# Patient Record
Sex: FEMALE | Race: WHITE | URBAN - NONMETROPOLITAN AREA
[De-identification: names, ages, dates, MRNs, and addresses within clinical notes are randomized per-mention and may not be internally consistent; named-entity substitution may affect disease eponyms.]

---

## 2023-02-15 ENCOUNTER — DOCTOR'S OFFICE (OUTPATIENT)
Dept: URBAN - NONMETROPOLITAN AREA CLINIC 1 | Facility: CLINIC | Age: 58
Setting detail: OPHTHALMOLOGY
End: 2023-02-15
Payer: COMMERCIAL

## 2023-02-15 DIAGNOSIS — H04.123: ICD-10-CM

## 2023-02-15 DIAGNOSIS — H25.13: ICD-10-CM

## 2023-02-15 DIAGNOSIS — H01.004: ICD-10-CM

## 2023-02-15 DIAGNOSIS — H18.832: ICD-10-CM

## 2023-02-15 DIAGNOSIS — H43.813: ICD-10-CM

## 2023-02-15 DIAGNOSIS — H01.001: ICD-10-CM

## 2023-02-15 DIAGNOSIS — H02.88B: ICD-10-CM

## 2023-02-15 PROCEDURE — 92014 COMPRE OPH EXAM EST PT 1/>: CPT | Performed by: OPHTHALMOLOGY

## 2023-02-15 PROCEDURE — 92134 CPTRZ OPH DX IMG PST SGM RTA: CPT | Performed by: OPHTHALMOLOGY

## 2023-02-15 ASSESSMENT — LID EXAM ASSESSMENTS
OD_BLEPHARITIS: T 1+
OS_BLEPHARITIS: T 1+

## 2023-02-15 ASSESSMENT — REFRACTION_MANIFEST
OD_CYLINDER: -0.50
OS_CYLINDER: -0.25
OD_AXIS: 090
OD_ADD: +2.50
OS_VA2: 20/20-2
OS_ADD: +2.50
OS_SPHERE: -2.50
OD_VA2: 20/25-2
OS_VA1: 20/25-2
OD_VA1: 20/30+2
OS_AXIS: 125
OD_SPHERE: -3.00

## 2023-02-15 ASSESSMENT — REFRACTION_CURRENTRX
OS_CYLINDER: -0.25
OS_ADD: +2.50
OD_VPRISM_DIRECTION: PROGS
OS_VPRISM_DIRECTION: PROGS
OS_SPHERE: -2.50
OD_OVR_VA: 20/
OD_AXIS: 099
OD_CYLINDER: -0.75
OD_ADD: +2.50
OS_AXIS: 114
OS_OVR_VA: 20/
OD_SPHERE: -3.00

## 2023-02-15 ASSESSMENT — VISUAL ACUITY
OD_BCVA: 20/30
OS_BCVA: 20/40

## 2023-02-15 ASSESSMENT — AXIALLENGTH_DERIVED
OD_AL: 23.8565
OD_AL: 23.7573
OS_AL: 23.7432
OS_AL: 23.8423

## 2023-02-15 ASSESSMENT — CONFRONTATIONAL VISUAL FIELD TEST (CVF)
OS_FINDINGS: FULL
OD_FINDINGS: FULL

## 2023-02-15 ASSESSMENT — KERATOMETRY
OS_AXISANGLE_DEGREES: 001
OD_K2POWER_DIOPTERS: 46.50
OD_K1POWER_DIOPTERS: 46.50
OS_K1POWER_DIOPTERS: 46.00
OD_AXISANGLE_DEGREES: 157
OS_K2POWER_DIOPTERS: 45.75

## 2023-02-15 ASSESSMENT — SPHEQUIV_DERIVED
OS_SPHEQUIV: -2.875
OS_SPHEQUIV: -2.625
OD_SPHEQUIV: -3.25
OD_SPHEQUIV: -3.5

## 2023-02-15 ASSESSMENT — REFRACTION_AUTOREFRACTION
OD_CYLINDER: -1.50
OS_SPHERE: -2.50
OD_SPHERE: -2.75
OS_CYLINDER: -0.75
OS_AXIS: 081
OD_AXIS: 101

## 2023-02-15 ASSESSMENT — PUNCTA - ASSESSMENT
OS_PUNCTA: SIL PLUG LLL LARGE
OD_PUNCTA: SIL PLUG RLL LARGE

## 2023-02-28 ENCOUNTER — OPTICAL OFFICE (OUTPATIENT)
Dept: URBAN - NONMETROPOLITAN AREA CLINIC 4 | Facility: CLINIC | Age: 58
Setting detail: OPHTHALMOLOGY
End: 2023-02-28
Payer: COMMERCIAL

## 2023-02-28 ENCOUNTER — DOCTOR'S OFFICE (OUTPATIENT)
Dept: URBAN - NONMETROPOLITAN AREA CLINIC 1 | Facility: CLINIC | Age: 58
Setting detail: OPHTHALMOLOGY
End: 2023-02-28
Payer: COMMERCIAL

## 2023-02-28 DIAGNOSIS — H52.4: ICD-10-CM

## 2023-02-28 DIAGNOSIS — H52.13: ICD-10-CM

## 2023-02-28 PROBLEM — H01.001 BLEPHARITIS; RIGHT UPPER LID, LEFT UPPER LID: Status: ACTIVE | Noted: 2023-02-15

## 2023-02-28 PROBLEM — H01.004 BLEPHARITIS; RIGHT UPPER LID, LEFT UPPER LID: Status: ACTIVE | Noted: 2023-02-15

## 2023-02-28 PROCEDURE — V2784 LENS POLYCARB OR EQUAL: HCPCS | Performed by: OPTOMETRIST

## 2023-02-28 PROCEDURE — V2020 VISION SVCS FRAMES PURCHASES: HCPCS | Performed by: OPTOMETRIST

## 2023-02-28 PROCEDURE — 92012 INTRM OPH EXAM EST PATIENT: CPT | Performed by: OPTOMETRIST

## 2023-02-28 PROCEDURE — 92015 DETERMINE REFRACTIVE STATE: CPT | Performed by: OPTOMETRIST

## 2023-02-28 PROCEDURE — V2203 LENS SPHCYL BIFOCAL 4.00D/.1: HCPCS | Performed by: OPTOMETRIST

## 2023-02-28 ASSESSMENT — REFRACTION_AUTOREFRACTION
OS_AXIS: 35
OD_AXIS: 107
OS_CYLINDER: -0.50
OS_SPHERE: -2.50
OD_CYLINDER: -2.00
OD_SPHERE: -2.50

## 2023-02-28 ASSESSMENT — REFRACTION_MANIFEST
OS_VA1: 20/25-2
OS_ADD: +2.50
OD_ADD: +2.50
OS_AXIS: 125
OD_VA2: 20/25-2
OD_VA1: 20/30+2
OD_AXIS: 095
OS_CYLINDER: -0.50
OD_SPHERE: -3.00
OS_VA2: 20/25-2
OS_SPHERE: -2.50
OD_CYLINDER: -0.75

## 2023-02-28 ASSESSMENT — REFRACTION_CURRENTRX
OD_VPRISM_DIRECTION: BF
OS_CYLINDER: -0.25
OS_AXIS: 117
OD_OVR_VA: 20/
OD_CYLINDER: -0.75
OD_SPHERE: -3.00
OS_OVR_VA: 20/
OS_VPRISM_DIRECTION: BF
OS_ADD: +2.50
OS_SPHERE: -2.50
OD_AXIS: 95
OD_ADD: +2.50

## 2023-02-28 ASSESSMENT — CONFRONTATIONAL VISUAL FIELD TEST (CVF)
OS_FINDINGS: FULL
OD_FINDINGS: FULL

## 2023-02-28 ASSESSMENT — LID EXAM ASSESSMENTS
OD_BLEPHARITIS: T 1+
OS_BLEPHARITIS: T 1+

## 2023-02-28 ASSESSMENT — SPHEQUIV_DERIVED
OS_SPHEQUIV: -2.75
OS_SPHEQUIV: -2.75
OD_SPHEQUIV: -3.5
OD_SPHEQUIV: -3.375

## 2023-02-28 ASSESSMENT — PUNCTA - ASSESSMENT
OS_PUNCTA: SIL PLUG LLL LARGE
OD_PUNCTA: SIL PLUG RLL LARGE

## 2023-02-28 ASSESSMENT — VISUAL ACUITY
OD_BCVA: 20/30-2
OS_BCVA: 20/40

## 2023-08-22 ENCOUNTER — OFFICE VISIT (OUTPATIENT)
Dept: URGENT CARE | Facility: CLINIC | Age: 58
End: 2023-08-22
Payer: COMMERCIAL

## 2023-08-22 ENCOUNTER — HOSPITAL ENCOUNTER (OUTPATIENT)
Dept: RADIOLOGY | Facility: CLINIC | Age: 58
Discharge: HOME | End: 2023-08-22
Admitting: FAMILY MEDICINE
Payer: COMMERCIAL

## 2023-08-22 ENCOUNTER — HOSPITAL ENCOUNTER (OUTPATIENT)
Dept: RADIOLOGY | Facility: CLINIC | Age: 58
Discharge: HOME | End: 2023-08-22
Payer: COMMERCIAL

## 2023-08-22 VITALS
WEIGHT: 155 LBS | DIASTOLIC BLOOD PRESSURE: 88 MMHG | SYSTOLIC BLOOD PRESSURE: 148 MMHG | HEART RATE: 90 BPM | HEIGHT: 64 IN | RESPIRATION RATE: 16 BRPM | BODY MASS INDEX: 26.46 KG/M2 | TEMPERATURE: 98.1 F | OXYGEN SATURATION: 100 %

## 2023-08-22 DIAGNOSIS — S79.912A HIP INJURY, LEFT, INITIAL ENCOUNTER: ICD-10-CM

## 2023-08-22 DIAGNOSIS — S83.92XA SPRAIN OF LEFT KNEE, UNSPECIFIED LIGAMENT, INITIAL ENCOUNTER: ICD-10-CM

## 2023-08-22 DIAGNOSIS — M25.562 ACUTE PAIN OF LEFT KNEE: ICD-10-CM

## 2023-08-22 DIAGNOSIS — M25.552 PAIN OF LEFT HIP: Primary | ICD-10-CM
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
## 2023-08-22 PROCEDURE — S9088 SERVICES PROVIDED IN URGENT: HCPCS

## 2023-08-22 PROCEDURE — 73502 X-RAY EXAM HIP UNI 2-3 VIEWS: CPT

## 2023-08-22 PROCEDURE — 99213 OFFICE O/P EST LOW 20 MIN: CPT

## 2023-08-22 PROCEDURE — 73564 X-RAY EXAM KNEE 4 OR MORE: CPT

## 2023-08-22 RX ORDER — FLUTICASONE PROPIONATE 50 MCG
SPRAY, SUSPENSION (ML) NASAL
COMMUNITY
Start: 2023-06-27

## 2023-08-22 RX ORDER — CETIRIZINE HYDROCHLORIDE 10 MG/1
TABLET ORAL
COMMUNITY
Start: 2023-08-21

## 2023-08-22 RX ORDER — SERTRALINE HYDROCHLORIDE 25 MG/1
TABLET, FILM COATED ORAL
COMMUNITY
Start: 2023-08-04

## 2023-08-22 RX ORDER — LEVOTHYROXINE SODIUM 0.07 MG/1
TABLET ORAL
COMMUNITY
Start: 2023-07-31

## 2023-08-22 RX ORDER — CALCIUM CARBONATE/VITAMIN D3 600 MG-10
TABLET ORAL
COMMUNITY
Start: 2023-07-24

## 2023-08-22 RX ORDER — ATORVASTATIN CALCIUM 40 MG/1
40 TABLET, FILM COATED ORAL DAILY
COMMUNITY
Start: 2023-02-27

## 2023-08-22 RX ORDER — PANTOPRAZOLE SODIUM 40 MG/1
40 TABLET, DELAYED RELEASE ORAL DAILY
COMMUNITY
Start: 2023-02-27

## 2023-08-22 NOTE — PROGRESS NOTES
[facility]        NAME: Sonia Rodriguez is a 59 y.o. female  : 1965    MRN: [id number]  DATE: 2023  TIME: 6:45 PM    Assessment and Plan   Pain of left hip [M25.552]  1. Pain of left hip  XR hip/pelv 2-3 vws left if performed      2. Sprain of left knee, unspecified ligament, initial encounter  XR knee 4+ vw left injury    Orthopedic injury treatment    Ambulatory Referral to Orthopedic Surgery      3. Fall, initial encounter          Orthopedic injury treatment    Date/Time: 2023 6:40 PM    Performed by: ALISHA Love  Authorized by: David Tapia DO    Patient Location:  Clinic  [place] Protocol:  Procedure performed by: Laith Bunn RN)  Consent: Verbal consent obtained. Risks and benefits: risks, benefits and alternatives were discussed  Consent given by: patient  Patient understanding: patient states understanding of the procedure being performed  Patient identity confirmed: verbally with patient      Injury location:  Knee  Location details:  Left knee  Injury type: Soft tissue  Neurovascular status: Neurovascularly intact    Distal perfusion: normal    Neurological function: normal    Range of motion: normal    Immobilization:  Brace (left hinged knee brace )  Neurovascular status: Neurovascularly intact    Distal perfusion: normal    Neurological function: normal    Range of motion: unchanged    Patient tolerance:  Patient tolerated the procedure well with no immediate complications        Patient able to ambulate and bear weight in clinic. Preliminary XR reads negative for acute osseous abnormality, final reads pending. Hinged knee brace applied by nursing staff. Encouraged continued supportive measures, including RICE therapy and OTC Tylenol/Ibuprofen. Referral placed to Orthopedic Surgery. Follow up with PCP in 3-5 days or proceed to emergency department for worsening symptoms. Patient verbalized understanding of instructions given.        Patient Instructions     Patient Instructions       Preliminary XR reads negative, final reads pending  Rest, Ice, Compression, and Elevation  OTC Tylenol/Ibuprofen for pain  Wear knee brace for support  Follow up with PCP in 3-5 days. Proceed to  ER if symptoms worsen. Hip Pain   WHAT YOU NEED TO KNOW:   What causes hip pain? Hip pain can be caused by a number of conditions, such as bursitis, arthritis, or muscle or tendon strain. You may have swelling in the fluid-filled sacs that protect your muscles and tendons. Hip pain can also be caused by a lower back problem. Hip pain may be caused by trauma, playing sports, or running. Pain may start in your hip and go to your thigh, buttock, or groin. How can I manage hip pain? You may need x-rays to make sure there are no broken bones that need to be treated. • Rest  your injured hip so that it can heal. You may need to avoid putting any weight on your hip for at least 48 hours. Return to normal activities as directed. • Ice  the injury for 20 minutes every 4 hours, or as directed. Use an ice pack, or put crushed ice in a plastic bag. Cover it with a towel to protect your skin. Ice helps prevent tissue damage and decreases swelling and pain. • Elevate  your injured hip above the level of your heart as often as you can. This will help decrease swelling and pain. If possible, prop your hip and leg on pillows or blankets to keep the area elevated comfortably. • NSAIDs , such as ibuprofen, help decrease swelling, pain, and fever. This medicine is available with or without a doctor's order. NSAIDs can cause stomach bleeding or kidney problems in certain people. If you take blood thinner medicine, always ask your healthcare provider if NSAIDs are safe for you. Always read the medicine label and follow directions. • Maintain a healthy weight. Extra body weight can cause pressure and pain in your hip, knee, and ankle joints.  Ask your healthcare provider how much you should weigh. Ask him or her to help you create a weight loss plan if you are overweight. • Use assistive devices as directed. You may need to use a cane or crutches. Assistive devices help decrease pain and pressure on your hip when you walk. Ask your healthcare provider for more information about assistive devices and how to use them correctly. When should I seek immediate care? • Your pain gets worse. • You have numbness in your leg or toes. • You cannot put any weight on or move your hip. When should I contact my healthcare provider? • You have a fever. • Your pain does not decrease, even after treatment. • You have questions or concerns about your condition or care. CARE AGREEMENT:   You have the right to help plan your care. Learn about your health condition and how it may be treated. Discuss treatment options with your healthcare providers to decide what care you want to receive. You always have the right to refuse treatment. The above information is an  only. It is not intended as medical advice for individual conditions or treatments. Talk to your doctor, nurse or pharmacist before following any medical regimen to see if it is safe and effective for you. © Copyright [employer] 2022 Information is for End User's use only and may not be sold, redistributed or otherwise used for commercial purposes. Knee Sprain   AMBULATORY CARE:   A knee sprain  is a stretched or torn ligament in your knee. Ligaments support the knee and keep the joint and bones in the correct position. A knee sprain may involve one or more ligaments. Common symptoms include the following:   • Stiffness or decreased movement    • Pain or tenderness    • Painful pop that you can hear or feel    • Swelling or bruising    • Knee that sly or gives out when you try to walk    Seek care immediately if:   • Any part of your leg feels cold, numb, or looks pale.       Call your doctor if: • You have new or increased swelling, bruising, or pain in your knee. • Your symptoms do not improve within 6 weeks, even with treatment. • You have questions or concerns about your condition or care. Treatment  depends on the type and cause of your knee sprain. You may need any of the following:  • NSAIDs , such as ibuprofen, help decrease swelling, pain, and fever. This medicine is available with or without a doctor's order. NSAIDs can cause stomach bleeding or kidney problems in certain people. If you take blood thinner medicine, always ask your healthcare provider if NSAIDs are safe for you. Always read the medicine label and follow directions. • Acetaminophen  decreases pain and fever. It is available without a doctor's order. Ask how much to take and how often to take it. Follow directions. Read the labels of all other medicines you are using to see if they also contain acetaminophen, or ask your doctor or pharmacist. Acetaminophen can cause liver damage if not taken correctly. • Prescription pain medicine  may be given. Ask your healthcare provider how to take this medicine safely. Some prescription pain medicines contain acetaminophen. Do not take other medicines that contain acetaminophen without talking to your healthcare provider. Too much acetaminophen may cause liver damage. Prescription pain medicine may cause constipation. Ask your healthcare provider how to prevent or treat constipation. • A support device  such as a splint or brace may be needed. These devices limit movement and protect the joint while it heals. You may be given crutches to use until you can stand on your injured leg without pain. • Physical therapy  may be needed. A physical therapist teaches you exercises to help improve movement and strength, and to decrease pain. • Surgery  may be needed if other treatments do not work or your strain is severe.  Surgery may include a knee arthroscopy to look inside your knee joint and repair damage. Manage a knee sprain:   • Rest  your knee and do not exercise. Do not walk on your injured leg if you are told to keep weight off your knee. Rest helps decrease swelling and allows the injury to heal. You can do gentle range of motion exercises as directed to prevent stiffness. • Apply ice  on your knee for 15 to 20 minutes every hour or as directed. Use an ice pack, or put crushed ice in a plastic bag. Cover the bag with a towel before you apply it. Ice helps prevent tissue damage and decreases swelling and pain. • Apply compression  to your knee as directed. You may need to wear an elastic bandage. This helps keep your injured knee from moving too much while it heals. It should be tight enough to give support but so tight that it causes your toes to feel numb or tingly. Take the bandage off and rewrap it at least 1 time each day. • Elevate your knee  above the level of your heart as often as you can. This will help decrease swelling and pain. Prop your leg on pillows or blankets to keep it elevated comfortably. Do not put pillows directly behind your knee. Prevent another knee sprain:  Exercise your legs to keep your muscles strong. Strong leg muscles help protect your knee and prevent strain. The following may also prevent a knee sprain:  • Slowly start your exercise or training program.  Slowly increase the time, distance, and intensity of your exercise. Sudden increases in training may cause another knee sprain. • Wear protective braces and equipment as directed. Braces may prevent your knee from moving the wrong way and causing another sprain. Protective equipment may support your bones and ligaments to prevent injury. • Warm up and stretch before exercise. Warm up by walking or using an exercise bike before starting your regular exercise. Do gentle stretches after warming up. This helps to loosen your muscles and decrease stress on your knee.  Cool down and stretch after you exercise. • Wear shoes that fit correctly and support your feet. Replace your running or exercise shoes before the padding or shock absorption is worn out. Ask your healthcare provider which exercise shoes are best for you. Ask if you should wear shoe inserts. Shoe inserts can help support your heels and arches or keep your foot lined up correctly in your shoes. Exercise on flat surfaces. Follow up with your doctor as directed:  Write down your questions so you remember to ask them during your visits. © Copyright [facility] 2022 Information is for End User's use only and may not be sold, redistributed or otherwise used for commercial purposes. The above information is an  only. It is not intended as medical advice for individual conditions or treatments. Talk to your doctor, nurse or pharmacist before following any medical regimen to see if it is safe and effective for you. Chief Complaint     Chief Complaint   Patient presents with   • Fall     Slipped on a wet ground 1 day ago onto left side and injured left hip, femur and left knee. Was in a lot of pain yesterday but today area from left knee to left foot is numb         History of Present Illness       68-year-old female with a past medical history significant for anxiety and depression presents following mechanical fall yesterday. Patient reports slipping on wet ground and falling directly onto the left hip and left knee. She is endorsing pain that radiates down left lower extremity and is accompanied by intermittent numbness. She states some swelling to left knee but denies bruising. No head strike or loss of consciousness. No blood thinners. She has been taking OTC medications for pain but not applying ice. She is able to ambulate and bear weight but with some difficulty, no limping. Fall  Associated symptoms include numbness.  Pertinent negatives include no abdominal pain, fever, nausea or vomiting. Review of Systems   Review of Systems   Constitutional: Negative for chills and fever. HENT: Negative for congestion, ear discharge, ear pain, rhinorrhea, sore throat, trouble swallowing and voice change. Eyes: Negative for discharge. Respiratory: Negative for cough and shortness of breath. Cardiovascular: Negative for chest pain. Gastrointestinal: Negative for abdominal pain, diarrhea, nausea and vomiting. Musculoskeletal: Positive for arthralgias, gait problem and joint swelling. Skin: Negative for color change and rash. Neurological: Positive for numbness. Negative for weakness.          Current Medications       Current Outpatient Medications:   •  atorvastatin (LIPITOR) 40 mg tablet, Take 40 mg by mouth daily, Disp: , Rfl:   •  Calcium + Vitamin D3 600-10 MG-MCG TABS, , Disp: , Rfl:   •  cetirizine (ZyrTEC) 10 mg tablet, , Disp: , Rfl:   •  fluticasone (FLONASE) 50 mcg/act nasal spray, , Disp: , Rfl:   •  levothyroxine 75 mcg tablet, , Disp: , Rfl:   •  pantoprazole (PROTONIX) 40 mg tablet, Take 40 mg by mouth daily, Disp: , Rfl:   •  sertraline (ZOLOFT) 25 mg tablet, , Disp: , Rfl:     Current Allergies     Allergies as of 08/22/2023 - Reviewed 08/22/2023   Allergen Reaction Noted   • Aspirin Other (See Comments) 06/26/2019   • Doxycycline Other (See Comments) 06/04/2010   • Escitalopram Other (See Comments) 06/04/2010   • Naproxen Other (See Comments) 06/04/2010            The following portions of the patient's history were reviewed and updated as appropriate: allergies, current medications, past family history, past medical history, past social history, past surgical history and problem list.     Past Medical History:   Diagnosis Date   • Allergic    • Anxiety    • Depression    • Disease of thyroid gland    • GERD (gastroesophageal reflux disease)    • High cholesterol        Past Surgical History:   Procedure Laterality Date   • ADENOIDECTOMY     • APPENDECTOMY     • TONSILLECTOMY         Family History   Problem Relation Age of Onset   • Dementia Mother    • Cancer Father          Medications have been verified. Objective   /88   Pulse 90   Temp 98.1 °F (36.7 °C)   Resp 16   Ht 5' 4" (1.626 m)   Wt 70.3 kg (155 lb)   SpO2 100%   BMI 26.61 kg/m²   No LMP recorded. Patient is postmenopausal.       Physical Exam     Physical Exam  Vitals and nursing note reviewed. Constitutional:       General: She is not in acute distress. Appearance: She is not toxic-appearing. HENT:      Head: Normocephalic. Nose: Nose normal.      Mouth/Throat:      Mouth: Mucous membranes are moist.      Pharynx: Oropharynx is clear. Eyes:      Conjunctiva/sclera: Conjunctivae normal.   Cardiovascular:      Rate and Rhythm: Normal rate and regular rhythm. Heart sounds: Normal heart sounds. Pulmonary:      Effort: Pulmonary effort is normal. No respiratory distress. Breath sounds: Normal breath sounds. No stridor. No wheezing, rhonchi or rales. Abdominal:      General: Bowel sounds are normal.      Palpations: Abdomen is soft. Tenderness: There is no abdominal tenderness. Musculoskeletal:         General: Swelling and tenderness present. Left hip: Tenderness and bony tenderness present. Normal range of motion. Normal strength. Left upper leg: Normal.      Left knee: Swelling and bony tenderness present. No ecchymosis. Normal range of motion. Tenderness present. Left lower leg: Normal.      Left ankle: Normal.      Left foot: Normal.        Legs:       Comments: No pelvic instability    Skin:     General: Skin is warm and dry. Neurological:      Mental Status: She is alert and oriented to person, place, and time. Sensory: Sensation is intact. Motor: Motor function is intact. Gait: Gait is intact.    Psychiatric:         Mood and Affect: Mood normal.         Behavior: Behavior normal.

## 2023-08-22 NOTE — PATIENT INSTRUCTIONS
Preliminary XR reads negative, final reads pending  Rest, Ice, Compression, and Elevation  OTC Tylenol/Ibuprofen for pain  Wear knee brace for support  Follow up with PCP in 3-5 days. Proceed to  ER if symptoms worsen. Hip Pain   WHAT YOU NEED TO KNOW:   What causes hip pain? Hip pain can be caused by a number of conditions, such as bursitis, arthritis, or muscle or tendon strain. You may have swelling in the fluid-filled sacs that protect your muscles and tendons. Hip pain can also be caused by a lower back problem. Hip pain may be caused by trauma, playing sports, or running. Pain may start in your hip and go to your thigh, buttock, or groin. How can I manage hip pain? You may need x-rays to make sure there are no broken bones that need to be treated. Rest  your injured hip so that it can heal. You may need to avoid putting any weight on your hip for at least 48 hours. Return to normal activities as directed. Ice  the injury for 20 minutes every 4 hours, or as directed. Use an ice pack, or put crushed ice in a plastic bag. Cover it with a towel to protect your skin. Ice helps prevent tissue damage and decreases swelling and pain. Elevate  your injured hip above the level of your heart as often as you can. This will help decrease swelling and pain. If possible, prop your hip and leg on pillows or blankets to keep the area elevated comfortably. NSAIDs , such as ibuprofen, help decrease swelling, pain, and fever. This medicine is available with or without a doctor's order. NSAIDs can cause stomach bleeding or kidney problems in certain people. If you take blood thinner medicine, always ask your healthcare provider if NSAIDs are safe for you. Always read the medicine label and follow directions. Maintain a healthy weight. Extra body weight can cause pressure and pain in your hip, knee, and ankle joints. Ask your healthcare provider how much you should weigh.  Ask him or her to help you create a weight loss plan if you are overweight. Use assistive devices as directed. You may need to use a cane or crutches. Assistive devices help decrease pain and pressure on your hip when you walk. Ask your healthcare provider for more information about assistive devices and how to use them correctly. When should I seek immediate care? Your pain gets worse. You have numbness in your leg or toes. You cannot put any weight on or move your hip. When should I contact my healthcare provider? You have a fever. Your pain does not decrease, even after treatment. You have questions or concerns about your condition or care. CARE AGREEMENT:   You have the right to help plan your care. Learn about your health condition and how it may be treated. Discuss treatment options with your healthcare providers to decide what care you want to receive. You always have the right to refuse treatment. The above information is an  only. It is not intended as medical advice for individual conditions or treatments. Talk to your doctor, nurse or pharmacist before following any medical regimen to see if it is safe and effective for you. © Copyright [employer] 2022 Information is for End User's use only and may not be sold, redistributed or otherwise used for commercial purposes. Knee Sprain   AMBULATORY CARE:   A knee sprain  is a stretched or torn ligament in your knee. Ligaments support the knee and keep the joint and bones in the correct position. A knee sprain may involve one or more ligaments. Common symptoms include the following:   Stiffness or decreased movement    Pain or tenderness    Painful pop that you can hear or feel    Swelling or bruising    Knee that sly or gives out when you try to walk    Seek care immediately if:   Any part of your leg feels cold, numb, or looks pale. Call your doctor if:   You have new or increased swelling, bruising, or pain in your knee.     Your symptoms do not improve within 6 weeks, even with treatment. You have questions or concerns about your condition or care. Treatment  depends on the type and cause of your knee sprain. You may need any of the following:  NSAIDs , such as ibuprofen, help decrease swelling, pain, and fever. This medicine is available with or without a doctor's order. NSAIDs can cause stomach bleeding or kidney problems in certain people. If you take blood thinner medicine, always ask your healthcare provider if NSAIDs are safe for you. Always read the medicine label and follow directions. Acetaminophen  decreases pain and fever. It is available without a doctor's order. Ask how much to take and how often to take it. Follow directions. Read the labels of all other medicines you are using to see if they also contain acetaminophen, or ask your doctor or pharmacist. Acetaminophen can cause liver damage if not taken correctly. Prescription pain medicine  may be given. Ask your healthcare provider how to take this medicine safely. Some prescription pain medicines contain acetaminophen. Do not take other medicines that contain acetaminophen without talking to your healthcare provider. Too much acetaminophen may cause liver damage. Prescription pain medicine may cause constipation. Ask your healthcare provider how to prevent or treat constipation. A support device  such as a splint or brace may be needed. These devices limit movement and protect the joint while it heals. You may be given crutches to use until you can stand on your injured leg without pain. Physical therapy  may be needed. A physical therapist teaches you exercises to help improve movement and strength, and to decrease pain. Surgery  may be needed if other treatments do not work or your strain is severe. Surgery may include a knee arthroscopy to look inside your knee joint and repair damage. Manage a knee sprain:   Rest  your knee and do not exercise. Do not walk on your injured leg if you are told to keep weight off your knee. Rest helps decrease swelling and allows the injury to heal. You can do gentle range of motion exercises as directed to prevent stiffness. Apply ice  on your knee for 15 to 20 minutes every hour or as directed. Use an ice pack, or put crushed ice in a plastic bag. Cover the bag with a towel before you apply it. Ice helps prevent tissue damage and decreases swelling and pain. Apply compression  to your knee as directed. You may need to wear an elastic bandage. This helps keep your injured knee from moving too much while it heals. It should be tight enough to give support but so tight that it causes your toes to feel numb or tingly. Take the bandage off and rewrap it at least 1 time each day. Elevate your knee  above the level of your heart as often as you can. This will help decrease swelling and pain. Prop your leg on pillows or blankets to keep it elevated comfortably. Do not put pillows directly behind your knee. Prevent another knee sprain:  Exercise your legs to keep your muscles strong. Strong leg muscles help protect your knee and prevent strain. The following may also prevent a knee sprain:  Slowly start your exercise or training program.  Slowly increase the time, distance, and intensity of your exercise. Sudden increases in training may cause another knee sprain. Wear protective braces and equipment as directed. Braces may prevent your knee from moving the wrong way and causing another sprain. Protective equipment may support your bones and ligaments to prevent injury. Warm up and stretch before exercise. Warm up by walking or using an exercise bike before starting your regular exercise. Do gentle stretches after warming up. This helps to loosen your muscles and decrease stress on your knee. Cool down and stretch after you exercise. Wear shoes that fit correctly and support your feet.   Replace your running or exercise shoes before the padding or shock absorption is worn out. Ask your healthcare provider which exercise shoes are best for you. Ask if you should wear shoe inserts. Shoe inserts can help support your heels and arches or keep your foot lined up correctly in your shoes. Exercise on flat surfaces. Follow up with your doctor as directed:  Write down your questions so you remember to ask them during your visits. © Copyright [employer]let[employer] 2022 Information is for End User's use only and may not be sold, redistributed or otherwise used for commercial purposes. The above information is an  only. It is not intended as medical advice for individual conditions or treatments. Talk to your doctor, nurse or pharmacist before following any medical regimen to see if it is safe and effective for you.

## 2023-09-08 ENCOUNTER — OFFICE VISIT (OUTPATIENT)
Dept: OBGYN CLINIC | Facility: CLINIC | Age: 58
End: 2023-09-08
Payer: COMMERCIAL

## 2023-09-08 VITALS
SYSTOLIC BLOOD PRESSURE: 132 MMHG | BODY MASS INDEX: 28 KG/M2 | HEART RATE: 80 BPM | DIASTOLIC BLOOD PRESSURE: 90 MMHG | TEMPERATURE: 97.8 F | WEIGHT: 164 LBS | HEIGHT: 64 IN

## 2023-09-08 DIAGNOSIS — R26.9 GAIT DIFFICULTY: ICD-10-CM

## 2023-09-08 DIAGNOSIS — M25.562 ACUTE PAIN OF LEFT KNEE: ICD-10-CM

## 2023-09-08 DIAGNOSIS — S80.02XA CONTUSION OF LEFT KNEE, INITIAL ENCOUNTER: Primary | ICD-10-CM

## 2023-09-08 DIAGNOSIS — M17.12 PRIMARY OSTEOARTHRITIS OF LEFT KNEE: ICD-10-CM

## 2023-09-08 PROCEDURE — 99204 OFFICE O/P NEW MOD 45 MIN: CPT | Performed by: STUDENT IN AN ORGANIZED HEALTH CARE EDUCATION/TRAINING PROGRAM

## 2023-09-08 NOTE — LETTER
September 12, 2023     [facility] [place] [address]  [address] [address]    Patient: Roni Thomas   YOB: 1965   Date of Visit: 9/8/2023       Dear Dr. Gaurav Johnson:    Thank you for referring Terry Rivera to me for evaluation. Below are my notes for this consultation. If you have questions, please do not hesitate to call me. I look forward to following your patient along with you. Sincerely,        Merlin Spore, MD        CC: No Recipients    Merlin Spore, MD  9/12/2023  8:08 AM  Signed  1. Contusion of left knee, initial encounter  Brace      2. Acute pain of left knee  Ambulatory Referral to Orthopedic Surgery    Brace      3. Primary osteoarthritis of left knee  Brace      4. Gait difficulty  Brace        Orders Placed This Encounter   Procedures   • Brace        Imaging Studies (I personally reviewed images in PACS and report):    X-ray left hip 8/22/2023: No acute osseous abnormalities. No significant degenerative changes. Soft tissues unremarkable. X-ray left knee 8/22/2023: No acute osseous abnormalities. No joint effusion. Mild medial tibiofemoral joint space narrowing. IMPRESSION:  Acute left hip/knee pain secondary to fall on left side  Radiographic imaging unremarkable other than mild degenerative changes of her left knee  Resolution of left hip pain but continues to have diffuse nonspecific knee pain/stiffness/tightness radiating down to her ankle likely due to her gait dysfunction and brace not fitting too tightly. PLAN:    Clinical exam and radiographic imaging reviewed with patient today, with impression as per above. I have discussed with the patient the pathophysiology of this diagnosis and reviewed how the examination correlates with this diagnosis. Prior imaging reviewed with patient today as noted above. Based on her mechanism of injury, clinical exam, radiographs I recommended conservative treatment at this time.   I did offer prescribing a hinged knee brace with an updated size for today. I counseled that given her hinged knee brace is not a type to use, she may be billed for a new hinged knee brace today which patient understands and still wishes to obtain her hinged knee brace today. I counseled use of the brace only as needed while ambulating with a goal of transitioning out over time. I recommended/offered formal physical therapy but patient prefers to do a home exercise program for now. This was provided to her today and I counseled daily adherence. In regards to pain control, she does have a noted history of allergies to naproxen and aspirin so I recommended continued use of acetaminophen, ibuprofen/Advil, heat/ice therapy 20 minutes on/off. I did consider prescribe her meloxicam but patient prefers to continue ibuprofen at this time. Counseled patient that she can call the office if she decides to attend physical therapy in the future. Return if symptoms worsen or fail to improve. Portions of the record may have been created with voice recognition software. Occasional wrong word or "sound a like" substitutions may have occurred due to the inherent limitations of voice recognition software. Read the chart carefully and recognize, using context, where substitutions have occurred. CHIEF COMPLAINT:  Chief Complaint   Patient presents with   • Left Knee - Pain         HPI:  Amy Da Silva is a 59 y.o. female  who presents for       Visit 9/8/2023 :  Initial evaluation of left knee pain/injury:  Ongoing issue for the past 2 weeks. She states stepping on a wet floor causing her to fall onto her left knee and hip. She reports she was only able to partially tolerate weightbearing and denies any bruising/swelling of her left hip/lower extremity/knee. She was then seen at urgent care and had imaging done as noted above. She was placed in a hinged knee brace.   She reports today that her left hip pain has resolved but she continues to experience persistent left knee pain that radiates down to her ankle. She describes it as an aching pain that is worse when transitioning from sitting to standing and walking. She reports pain is localized around her kneecap and over the medial lateral joint lines mainly of her knee. She reports intermittent swelling and stiffness of her knee. Denies any discoloration of her knee, clicking/popping. She states the pain can be severe enough that she feels she will give out. She reports use of the hinged knee brace but feels that it is very uncomfortable wearing the brace as it is "too tight."  She is wondering whether she needs a new size for her brace. Denies prior surgeries of her left knee in the past.  In regards to pain control she has been taking Tylenol and Motrin with minimal to no relief. She has not had an injection of her knee previously and prefers to avoid if possible. She has not seen formal physical therapy for this issue. Pain can interfere with her sleep at night; she has been using a pillow between her knees to help alleviate.           Medical, Surgical, Family, and Social History    Past Medical History:   Diagnosis Date   • Allergic    • Anxiety    • Depression    • Disease of thyroid gland    • GERD (gastroesophageal reflux disease)    • High cholesterol      Past Surgical History:   Procedure Laterality Date   • ADENOIDECTOMY     • APPENDECTOMY     • TONSILLECTOMY       Social History   Social History     Substance and Sexual Activity   Alcohol Use Never     Social History     Substance and Sexual Activity   Drug Use Never     Social History     Tobacco Use   Smoking Status Never   • Passive exposure: Never   Smokeless Tobacco Never     Family History   Problem Relation Age of Onset   • Dementia Mother    • Cancer Father      Allergies   Allergen Reactions   • Aspirin Other (See Comments)     Unsure of reaction     • Doxycycline Other (See Comments)     denies   • Escitalopram Other (See Comments)     denies   • Naproxen Other (See Comments)     denies          Physical Exam  /90 (BP Location: Left arm)   Pulse 80   Temp 97.8 °F (36.6 °C) (Temporal)   Ht 5' 4" (1.626 m)   Wt 74.4 kg (164 lb)   BMI 28.15 kg/m²     Constitutional:  see vital signs  Gen: well-developed, normocephalic/atraumatic, well-groomed  Eyes: No inflammation or discharge of conjunctiva or lids; sclera clear   Pharynx: no inflammation, lesion, or mass of lips  Pulmonary/Chest: Effort normal. No respiratory distress.        Ortho Exam  Left Knee Exam: (hinged knee brace removed)  Erythema: no  Swelling: no  Increased Warmth: no  Tenderness: +peripatellar joint line circumferentially, +LJL  ROM: 0-130  Knee flexion strength: 5/5  Knee extension strength: 5/5  Patellar Grind: negative  Lachman's: negative  Anterior Drawer: negative  Varus laxity: negative  Valgus laxity: negative  Mark: negative      Left hip ROM demonstrates no pain actively or passively    No calf tenderness to palpation     Gait: Antalgic    Procedures

## 2023-09-08 NOTE — PROGRESS NOTES
1. Contusion of left knee, initial encounter  Brace      2. Acute pain of left knee  Ambulatory Referral to Orthopedic Surgery    Brace      3. Primary osteoarthritis of left knee  Brace      4. Gait difficulty  Brace        Orders Placed This Encounter   Procedures   • Brace        Imaging Studies (I personally reviewed images in PACS and report):    • X-ray left hip 8/22/2023: No acute osseous abnormalities. No significant degenerative changes. Soft tissues unremarkable. • X-ray left knee 8/22/2023: No acute osseous abnormalities. No joint effusion. Mild medial tibiofemoral joint space narrowing. IMPRESSION:  • Acute left hip/knee pain secondary to fall on left side  • Radiographic imaging unremarkable other than mild degenerative changes of her left knee  • Resolution of left hip pain but continues to have diffuse nonspecific knee pain/stiffness/tightness radiating down to her ankle likely due to her gait dysfunction and brace not fitting too tightly. PLAN:    • Clinical exam and radiographic imaging reviewed with patient today, with impression as per above. I have discussed with the patient the pathophysiology of this diagnosis and reviewed how the examination correlates with this diagnosis. • Prior imaging reviewed with patient today as noted above. • Based on her mechanism of injury, clinical exam, radiographs I recommended conservative treatment at this time. I did offer prescribing a hinged knee brace with an updated size for today. I counseled that given her hinged knee brace is not a type to use, she may be billed for a new hinged knee brace today which patient understands and still wishes to obtain her hinged knee brace today. I counseled use of the brace only as needed while ambulating with a goal of transitioning out over time. • I recommended/offered formal physical therapy but patient prefers to do a home exercise program for now.   This was provided to her today and I counseled daily adherence. • In regards to pain control, she does have a noted history of allergies to naproxen and aspirin so I recommended continued use of acetaminophen, ibuprofen/Advil, heat/ice therapy 20 minutes on/off. I did consider prescribe her meloxicam but patient prefers to continue ibuprofen at this time. • Counseled patient that she can call the office if she decides to attend physical therapy in the future. Return if symptoms worsen or fail to improve. Portions of the record may have been created with voice recognition software. Occasional wrong word or "sound a like" substitutions may have occurred due to the inherent limitations of voice recognition software. Read the chart carefully and recognize, using context, where substitutions have occurred. CHIEF COMPLAINT:  Chief Complaint   Patient presents with   • Left Knee - Pain         HPI:  Vishnu Pretty is a 59 y.o. female  who presents for       Visit 9/8/2023 :  Initial evaluation of left knee pain/injury:  Ongoing issue for the past 2 weeks. She states stepping on a wet floor causing her to fall onto her left knee and hip. She reports she was only able to partially tolerate weightbearing and denies any bruising/swelling of her left hip/lower extremity/knee. She was then seen at urgent care and had imaging done as noted above. She was placed in a hinged knee brace. She reports today that her left hip pain has resolved but she continues to experience persistent left knee pain that radiates down to her ankle. She describes it as an aching pain that is worse when transitioning from sitting to standing and walking. She reports pain is localized around her kneecap and over the medial lateral joint lines mainly of her knee. She reports intermittent swelling and stiffness of her knee. Denies any discoloration of her knee, clicking/popping. She states the pain can be severe enough that she feels she will give out.   She reports use of the hinged knee brace but feels that it is very uncomfortable wearing the brace as it is "too tight."  She is wondering whether she needs a new size for her brace. Denies prior surgeries of her left knee in the past.  In regards to pain control she has been taking Tylenol and Motrin with minimal to no relief. She has not had an injection of her knee previously and prefers to avoid if possible. She has not seen formal physical therapy for this issue. Pain can interfere with her sleep at night; she has been using a pillow between her knees to help alleviate.           Medical, Surgical, Family, and Social History    Past Medical History:   Diagnosis Date   • Allergic    • Anxiety    • Depression    • Disease of thyroid gland    • GERD (gastroesophageal reflux disease)    • High cholesterol      Past Surgical History:   Procedure Laterality Date   • ADENOIDECTOMY     • APPENDECTOMY     • TONSILLECTOMY       Social History   Social History     Substance and Sexual Activity   Alcohol Use Never     Social History     Substance and Sexual Activity   Drug Use Never     Social History     Tobacco Use   Smoking Status Never   • Passive exposure: Never   Smokeless Tobacco Never     Family History   Problem Relation Age of Onset   • Dementia Mother    • Cancer Father      Allergies   Allergen Reactions   • Aspirin Other (See Comments)     Unsure of reaction     • Doxycycline Other (See Comments)     denies   • Escitalopram Other (See Comments)     denies   • Naproxen Other (See Comments)     denies          Physical Exam  /90 (BP Location: Left arm)   Pulse 80   Temp 97.8 °F (36.6 °C) (Temporal)   Ht 5' 4" (1.626 m)   Wt 74.4 kg (164 lb)   BMI 28.15 kg/m²     Constitutional:  see vital signs  Gen: well-developed, normocephalic/atraumatic, well-groomed  Eyes: No inflammation or discharge of conjunctiva or lids; sclera clear   Pharynx: no inflammation, lesion, or mass of lips  Pulmonary/Chest: Effort normal. No respiratory distress.        Ortho Exam  Left Knee Exam: (hinged knee brace removed)  Erythema: no  Swelling: no  Increased Warmth: no  Tenderness: +peripatellar joint line circumferentially, +LJL  ROM: 0-130  Knee flexion strength: 5/5  Knee extension strength: 5/5  Patellar Grind: negative  Lachman's: negative  Anterior Drawer: negative  Varus laxity: negative  Valgus laxity: negative  [facility]: negative      Left hip ROM demonstrates no pain actively or passively    No calf tenderness to palpation     Gait: Antalgic    Procedures

## 2024-03-14 ENCOUNTER — APPOINTMENT (EMERGENCY)
Dept: RADIOLOGY | Facility: HOSPITAL | Age: 59
End: 2024-03-14
Payer: COMMERCIAL

## 2024-03-14 ENCOUNTER — HOSPITAL ENCOUNTER (EMERGENCY)
Facility: HOSPITAL | Age: 59
Discharge: HOME | End: 2024-03-14
Attending: EMERGENCY MEDICINE
Payer: COMMERCIAL

## 2024-03-14 VITALS
OXYGEN SATURATION: 99 % | DIASTOLIC BLOOD PRESSURE: 81 MMHG | RESPIRATION RATE: 18 BRPM | HEART RATE: 83 BPM | TEMPERATURE: 97.1 F | SYSTOLIC BLOOD PRESSURE: 161 MMHG

## 2024-03-14 DIAGNOSIS — M79.672 LEFT FOOT PAIN: Primary | ICD-10-CM

## 2024-03-14 PROCEDURE — 73630 X-RAY EXAM OF FOOT: CPT

## 2024-03-14 PROCEDURE — 99284 EMERGENCY DEPT VISIT MOD MDM: CPT | Performed by: EMERGENCY MEDICINE

## 2024-03-14 PROCEDURE — 99283 EMERGENCY DEPT VISIT LOW MDM: CPT

## 2024-03-14 RX ORDER — ACETAMINOPHEN 325 MG/1
650 TABLET ORAL ONCE
Status: COMPLETED | OUTPATIENT
Start: 2024-03-14 | End: 2024-03-14

## 2024-03-14 RX ADMIN — ACETAMINOPHEN 325MG 650 MG: 325 TABLET ORAL at 19:06

## 2024-03-14 NOTE — ED PROVIDER NOTES
History  Chief Complaint   Patient presents with    Foot Pain     Patient c/o left foot pain.      Patient is a 58-year-old female presenting to the emergency department complaining of left lateral foot pain that is been bothering her since yesterday, she denies any injury or trauma, no fever or chills, no swelling, no bruising, no skin rashes or lesions, no history of similar symptoms previously        Prior to Admission Medications   Prescriptions Last Dose Informant Patient Reported? Taking?   Calcium + Vitamin D3 600-10 MG-MCG TABS   Yes No   atorvastatin (LIPITOR) 40 mg tablet   Yes No   Sig: Take 40 mg by mouth daily   cetirizine (ZyrTEC) 10 mg tablet   Yes No   fluticasone (FLONASE) 50 mcg/act nasal spray   Yes No   levothyroxine 75 mcg tablet   Yes No   pantoprazole (PROTONIX) 40 mg tablet   Yes No   Sig: Take 40 mg by mouth daily   sertraline (ZOLOFT) 25 mg tablet   Yes No      Facility-Administered Medications: None       Past Medical History:   Diagnosis Date    Allergic     Anxiety     Depression     Disease of thyroid gland     GERD (gastroesophageal reflux disease)     High cholesterol        Past Surgical History:   Procedure Laterality Date    ADENOIDECTOMY      APPENDECTOMY      TONSILLECTOMY         Family History   Problem Relation Age of Onset    Dementia Mother     Cancer Father      I have reviewed and agree with the history as documented.    E-Cigarette/Vaping    E-Cigarette Use Never User      E-Cigarette/Vaping Substances     Social History     Tobacco Use    Smoking status: Never     Passive exposure: Never    Smokeless tobacco: Never   Vaping Use    Vaping status: Never Used   Substance Use Topics    Alcohol use: Never    Drug use: Never       Review of Systems   Constitutional: Negative.    HENT: Negative.     Eyes: Negative.    Respiratory: Negative.     Cardiovascular: Negative.    Gastrointestinal: Negative.    Endocrine: Negative.    Genitourinary: Negative.    Musculoskeletal:   Positive for arthralgias.   Skin: Negative.    Allergic/Immunologic: Negative.    Neurological: Negative.    Hematological: Negative.    Psychiatric/Behavioral: Negative.         Physical Exam  Physical Exam  Constitutional:       Appearance: She is well-developed.   HENT:      Head: Normocephalic and atraumatic.   Eyes:      Conjunctiva/sclera: Conjunctivae normal.      Pupils: Pupils are equal, round, and reactive to light.   Cardiovascular:      Rate and Rhythm: Normal rate.   Pulmonary:      Effort: Pulmonary effort is normal.   Abdominal:      Palpations: Abdomen is soft.   Musculoskeletal:         General: Normal range of motion.      Cervical back: Normal range of motion and neck supple.        Feet:    Feet:      Comments: Tenderness to palpate along the lateral surface of the left foot, no ecchymosis or erythema, 2+ DP pulses, no bony deformity, distal sensation and motor is intact  Skin:     General: Skin is warm and dry.   Neurological:      Mental Status: She is alert and oriented to person, place, and time.         Vital Signs  ED Triage Vitals   Temperature Pulse Respirations Blood Pressure SpO2   03/14/24 1827 03/14/24 1827 03/14/24 1827 03/14/24 1827 03/14/24 1827   (!) 97.1 °F (36.2 °C) 83 18 161/81 99 %      Temp Source Heart Rate Source Patient Position - Orthostatic VS BP Location FiO2 (%)   03/14/24 1827 03/14/24 1827 03/14/24 1827 03/14/24 1827 --   Temporal Monitor Sitting Left arm       Pain Score       03/14/24 1906       4           Vitals:    03/14/24 1827   BP: 161/81   Pulse: 83   Patient Position - Orthostatic VS: Sitting         Visual Acuity      ED Medications  Medications   acetaminophen (TYLENOL) tablet 650 mg (650 mg Oral Given 3/14/24 1906)       Diagnostic Studies  Results Reviewed       None                   XR foot 3+ views LEFT   ED Interpretation by Josephine Wilson DO (03/14 1918)   No acute findings                 Procedures  Procedures         ED Course                                SBIRT 22yo+      Flowsheet Row Most Recent Value   Initial Alcohol Screen: US AUDIT-C     1. How often do you have a drink containing alcohol? 0 Filed at: 03/14/2024 1908   2. How many drinks containing alcohol do you have on a typical day you are drinking?  0 Filed at: 03/14/2024 1908   3a. Male UNDER 65: How often do you have five or more drinks on one occasion? 0 Filed at: 03/14/2024 1908   3b. FEMALE Any Age, or MALE 65+: How often do you have 4 or more drinks on one occassion? 0 Filed at: 03/14/2024 1908   Audit-C Score 0 Filed at: 03/14/2024 1908   LETICIA: How many times in the past year have you...    Used an illegal drug or used a prescription medication for non-medical reasons? Never Filed at: 03/14/2024 1908                      Medical Decision Making   Patient presents with left foot pain.  Given history, exam and work-up, patient likely has foot sprain.  I have low suspicion for fracture, dislocation, significant ligamentous injury, septic arthritis, gout flare, new autoimmune arthropathy or gonococcal arthropathy.      Problems Addressed:  Left foot pain: acute illness or injury    Amount and/or Complexity of Data Reviewed  Radiology: ordered and independent interpretation performed. Decision-making details documented in ED Course.    Risk  OTC drugs.             Disposition  Final diagnoses:   Left foot pain     Time reflects when diagnosis was documented in both MDM as applicable and the Disposition within this note       Time User Action Codes Description Comment    3/14/2024  7:20 PM Josephine Wilson [M79.672] Left foot pain           ED Disposition       ED Disposition   Discharge    Condition   Stable    Date/Time   Thu Mar 14, 2024 1920    Comment   Fariba Rothman discharge to home/self care.                   Follow-up Information       Follow up With Specialties Details Why Contact Info    Guerline Ledesma DPM Podiatry, Wound Care, General Surgery In 1 week  [address]  [place] [address]  [phone]              Discharge Medication List as of 3/14/2024  7:21 PM        CONTINUE these medications which have NOT CHANGED    Details   atorvastatin (LIPITOR) 40 mg tablet Take 40 mg by mouth daily, Starting Mon 2/27/2023, Historical Med      Calcium + Vitamin D3 600-10 MG-MCG TABS Starting Mon 7/24/2023, Historical Med      cetirizine (ZyrTEC) 10 mg tablet Starting Mon 8/21/2023, Historical Med      fluticasone (FLONASE) 50 mcg/act nasal spray Starting Tue 6/27/2023, Historical Med      levothyroxine 75 mcg tablet Starting Mon 7/31/2023, Historical Med      pantoprazole (PROTONIX) 40 mg tablet Take 40 mg by mouth daily, Starting Mon 2/27/2023, Historical Med      sertraline (ZOLOFT) 25 mg tablet Starting Fri 8/4/2023, Historical Med                 PDMP Review       None            ED Provider  Electronically Signed by             Josephine Wilson DO  03/14/24 0964

## 2024-03-22 ENCOUNTER — DOCTOR'S OFFICE (OUTPATIENT)
Dept: URBAN - NONMETROPOLITAN AREA CLINIC 1 | Facility: CLINIC | Age: 59
Setting detail: OPHTHALMOLOGY
End: 2024-03-22
Payer: COMMERCIAL

## 2024-03-22 DIAGNOSIS — H35.373: ICD-10-CM

## 2024-03-22 DIAGNOSIS — H43.813: ICD-10-CM

## 2024-03-22 DIAGNOSIS — H40.013: ICD-10-CM

## 2024-03-22 DIAGNOSIS — H25.13: ICD-10-CM

## 2024-03-22 DIAGNOSIS — H10.13: ICD-10-CM

## 2024-03-22 DIAGNOSIS — H04.123: ICD-10-CM

## 2024-03-22 PROCEDURE — 99214 OFFICE O/P EST MOD 30 MIN: CPT | Performed by: OPHTHALMOLOGY

## 2024-03-22 PROCEDURE — 92134 CPTRZ OPH DX IMG PST SGM RTA: CPT | Performed by: OPHTHALMOLOGY

## 2024-03-22 ASSESSMENT — LID EXAM ASSESSMENTS
OD_BLEPHARITIS: T 1+
OS_BLEPHARITIS: T 1+

## 2024-04-08 ENCOUNTER — HOSPITAL ENCOUNTER (OUTPATIENT)
Dept: RADIOLOGY | Facility: CLINIC | Age: 59
Discharge: HOME | End: 2024-04-08
Payer: COMMERCIAL

## 2024-04-08 ENCOUNTER — OFFICE VISIT (OUTPATIENT)
Dept: PODIATRY | Age: 59
End: 2024-04-08
Payer: COMMERCIAL

## 2024-04-08 VITALS
SYSTOLIC BLOOD PRESSURE: 142 MMHG | OXYGEN SATURATION: 99 % | WEIGHT: 165.8 LBS | DIASTOLIC BLOOD PRESSURE: 78 MMHG | BODY MASS INDEX: 28.46 KG/M2 | TEMPERATURE: 98.1 F | HEART RATE: 71 BPM

## 2024-04-08 DIAGNOSIS — M54.42 CHRONIC LOW BACK PAIN WITH LEFT-SIDED SCIATICA, UNSPECIFIED BACK PAIN LATERALITY: ICD-10-CM

## 2024-04-08 DIAGNOSIS — M79.672 LEFT FOOT PAIN: Primary | ICD-10-CM

## 2024-04-08 DIAGNOSIS — G89.29 CHRONIC LOW BACK PAIN WITH LEFT-SIDED SCIATICA, UNSPECIFIED BACK PAIN LATERALITY: ICD-10-CM

## 2024-04-08 DIAGNOSIS — M79.605 LOWER EXTREMITY PAIN, DIFFUSE, LEFT: ICD-10-CM

## 2024-04-08 DIAGNOSIS — G57.82 SURAL NEURITIS, LEFT: ICD-10-CM

## 2024-04-08 DIAGNOSIS — M79.672 LEFT FOOT PAIN: ICD-10-CM

## 2024-04-08 PROCEDURE — 73630 X-RAY EXAM OF FOOT: CPT

## 2024-04-08 PROCEDURE — 99203 OFFICE O/P NEW LOW 30 MIN: CPT | Performed by: STUDENT IN AN ORGANIZED HEALTH CARE EDUCATION/TRAINING PROGRAM

## 2024-04-08 RX ORDER — AZELASTINE HYDROCHLORIDE 0.5 MG/ML
SOLUTION/ DROPS OPHTHALMIC
COMMUNITY
Start: 2024-03-22

## 2024-04-08 NOTE — PROGRESS NOTES
Assessment/Plan:     Diagnoses and all orders for this visit:    Left foot pain  -     X-ray foot left 3+ views; Future  -     Ambulatory Referral to Podiatry  -     Ankle Cude ankle/Ankle Brace  -     Ambulatory referral to Physical Therapy; Future    Lower extremity pain, diffuse, left  -     Ambulatory referral to Spine & Pain Management; Future    Chronic low back pain with left-sided sciatica, unspecified back pain laterality  -     Ambulatory referral to Spine & Pain Management; Future    Sural neuritis, left  -     Ambulatory referral to Physical Therapy; Future    Other orders  -     azelastine (OPTIVAR) 0.05 % ophthalmic solution          Imaging Reviewed at this visit (I personally reviewed/independently interpreted images and reports in PACS)  XR left foot WB 3v 4/8/24: No acute osseous abnormalities noted. Mildly shortened 1st metatarsal. Minimal pes planus.   XR left foot NWB 3v 3/14/24: no acute osseous abnormalities noted.       IMPRESSION:  Left foot pain s/p fall injury (August 2023). Consider local injury such as bone contusion, tendon or ligamentous strain, nerve injury. Likely large component of radiculopathy.   Lower back with LLE sciatica (after injury as above)     PLAN:  I reviewed clinical exam and radiographic imaging (XR) with patient in detail today. I have discussed with the patient the pathophysiology of this diagnosis and reviewed how the examination correlates with this diagnosis.  ED note from 3/14/24 reviewed   Sports med note from 9/8/24 reviewed  I discussed her left foot pain as likely stemming from her lower back pain with radiculopathy. Xr as above without fractures. History and symptoms given by patient is a bit all over the place (for ex patient states she fell in March 2024 and saw orthopedics thereafter however documentation was in September 2023 of this).   Pain mngmt referral given  PT rx given  Supportive shoes and dananberg arch supports recommended  Lace up ankle brace  applied  F/u 4 wks for recheck    Subjective:      Patient ID: Fariba Rothman is a 58 y.o. female.    Fariba presents to clinic today concerning left foot and LE pain after falling March 2024. Has gone to ortho for assessment for LE pain. Told she had a pinched nerve and knee arthritis. Has seen Dr. Pereira who recommended PT however she does not want to pursue that. Foot pain is associated with hip pain but sometimes she gets the foot pain in absence of buttock pain. Notes it is tingling and burning in nature. Leg also gets numb.         The following portions of the patient's history were reviewed and updated as appropriate: allergies, current medications, past family history, past medical history, past social history, past surgical history, and problem list.    Review of Systems   Constitutional:  Negative for activity change, chills and fever.   HENT: Negative.     Respiratory:  Negative for cough, chest tightness and shortness of breath.    Cardiovascular:  Negative for chest pain and leg swelling.   Endocrine: Negative.    Genitourinary: Negative.    Neurological: Negative.  Negative for numbness.   Psychiatric/Behavioral: Negative.  Negative for agitation and behavioral problems.          Objective:      /78 (BP Location: Left arm, Patient Position: Sitting)   Pulse 71   Temp 98.1 °F (36.7 °C)   Wt 75.2 kg (165 lb 12.8 oz)   SpO2 99%   BMI 28.46 kg/m²          Physical Exam  Constitutional:       General: She is not in acute distress.     Appearance: Normal appearance. She is not ill-appearing.   Cardiovascular:      Pulses: Normal pulses.      Comments: Bilateral DP & PT pulses 2/4. CRT WNL. Pedal hair present. Feet warm and well perfused.   Pulmonary:      Effort: No respiratory distress.   Musculoskeletal:         General: Tenderness (LLE along course of sural nerve kathy to outer foot along 4/5 mets.) present.      Comments: Bilateral ankle, STJ, TNJ, TMTJ, MTPJ ROM WNL and without pain. LE muscle  strength WNL.  No pain with resisted eversion left foot along peroneal tendons.    Skin:     General: Skin is warm.      Capillary Refill: Capillary refill takes less than 2 seconds.      Findings: No erythema or lesion.   Neurological:      General: No focal deficit present.      Mental Status: She is alert and oriented to person, place, and time.      Sensory: No sensory deficit.      Comments: Gross sensation intact. + N/T/B to LLE from buttock distal   Psychiatric:         Mood and Affect: Mood normal.         Behavior: Behavior normal.

## 2024-04-24 ENCOUNTER — CONSULT (OUTPATIENT)
Dept: PAIN MEDICINE | Facility: CLINIC | Age: 59
End: 2024-04-24
Payer: COMMERCIAL

## 2024-04-24 ENCOUNTER — HOSPITAL ENCOUNTER (OUTPATIENT)
Dept: RADIOLOGY | Facility: CLINIC | Age: 59
Discharge: HOME | End: 2024-04-24
Payer: COMMERCIAL

## 2024-04-24 VITALS
RESPIRATION RATE: 18 BRPM | SYSTOLIC BLOOD PRESSURE: 148 MMHG | HEIGHT: 64 IN | BODY MASS INDEX: 28.51 KG/M2 | HEART RATE: 67 BPM | OXYGEN SATURATION: 97 % | DIASTOLIC BLOOD PRESSURE: 82 MMHG | WEIGHT: 167 LBS | TEMPERATURE: 98.2 F

## 2024-04-24 DIAGNOSIS — M54.16 LUMBAR RADICULOPATHY: Primary | ICD-10-CM

## 2024-04-24 DIAGNOSIS — M54.42 CHRONIC LOW BACK PAIN WITH LEFT-SIDED SCIATICA, UNSPECIFIED BACK PAIN LATERALITY: ICD-10-CM

## 2024-04-24 DIAGNOSIS — G89.29 CHRONIC LOW BACK PAIN WITH LEFT-SIDED SCIATICA, UNSPECIFIED BACK PAIN LATERALITY: ICD-10-CM

## 2024-04-24 DIAGNOSIS — M54.16 LUMBAR RADICULOPATHY: ICD-10-CM

## 2024-04-24 DIAGNOSIS — M79.605 LOWER EXTREMITY PAIN, DIFFUSE, LEFT: ICD-10-CM

## 2024-04-24 PROCEDURE — 72100 X-RAY EXAM L-S SPINE 2/3 VWS: CPT

## 2024-04-24 PROCEDURE — 99204 OFFICE O/P NEW MOD 45 MIN: CPT | Performed by: ANESTHESIOLOGY

## 2024-04-24 RX ORDER — GABAPENTIN 300 MG/1
300 CAPSULE ORAL 3 TIMES DAILY
Qty: 90 CAPSULE | Refills: 2 | Status: SHIPPED | OUTPATIENT
Start: 2024-04-24

## 2024-04-24 NOTE — PROGRESS NOTES
Assessment  1. Lumbar radiculopathy - Left    2. Lower extremity pain, diffuse, left  -     Ambulatory referral to Spine & Pain Management    3. Chronic low back pain with left-sided sciatica, unspecified back pain laterality  -     Ambulatory referral to Spine & Pain Management    Left-sided lumbar radicular pain in the L5 dermatomal distribution accompanied by pain limited weakness numbness and paresthesias.  Patient has not yet participated with PT. Chronic pain with decreased participation with IADLs over the past 3 months.  Has been taking OTC ibuprofen and tylenol in addition to gabapentin and Robaxin with modest benefit.  5/5 strength bilaterally, positive SLR left-sided. Reflexes 2+.  Additionally there is positive facet loading, left greater than right. Denies any gait instability, saddle anesthesia. No pertinent imaging available to review at this time.  Risks, benefits alternatives epidural steroid injections thoroughly discussed with patient.  Handouts provided questions answered to patient's satisfaction.  Lifestyle modifications extensively discussed including diet, exercise and weight loss in addition to core strengthening.  Will proceed with multimodal pain therapy plan as noted below:    Plan  -L5-S1 LESI; f/u 2 weeks post procedure  -gabapentin 300 mg t.i.d. Ordered for patient; counseled regarding sedative effects of taking this medication and provided up titration calendar.  Counseled not to take medication while driving or operating heavy machinery/using stairs  -xray lumbar spine; will f/u result with patient  -script provided for formal physical therapy for left sided lumbar radiculopathy; Physician directed home exercise plan as per [employer] demonstrated and handouts provided that patient plans to participate with for 1 hour, twice a week for the next 6 weeks.     There are risks associated with opioid medications, including dependence, addiction and tolerance. The patient understands and  agrees to use these medications only as prescribed. Potential side effects of the medications include, but are not limited to, constipation, drowsiness, addiction, impaired judgment and risk of fatal overdose if not taken as prescribed. The patient was warned against driving while taking sedation medications or operating heavy machinery. The patient voiced understanding. Sharing medications is a felony. At this point in time, the patient is showing no signs of addiction, abuse, diversion or suicidal ideation.     [place] Prescription Drug Monitoring Program report was reviewed and was appropriate      Complete risks and benefits including bleeding, infection, tissue reaction, nerve injury and allergic reaction were discussed. The approach was demonstrated using models and literature was provided. Verbal and written consent was obtained.     My impressions and treatment recommendations were discussed in detail with the patient who verbalized understanding and had no further questions.  Discharge instructions were provided. I personally saw and examined the patient and I agree with the above discussed plan of care.    No orders of the defined types were placed in this encounter.      History of Present Illness    Fariba Rothman is a 58 y.o. female with pmhx of anxiety, depression, GERD, XOL, hypothyroidism presenting with chronic left sided lumbar radicular pain in the L5 and S1 dermatomal distributions. Debilitating pain limited weakness numbness and paresthesias accompany the pain. The patient rates the pain at a 8/10 accompanied by electric shock-like shooting features and crampy burning pain in the aforementioned dermatomal distributions.  The pain is worse in the mornings as well as the end of the day; exertion such as walking for long periods of time seems to exacerbate the pain.   She tries to maintain an active lifestyle and finds that the current degree of pain seems to compromises her efforts.  The pain  significantly impacts independent activities of daily living and contributes to significant disability.  She has not yet attempted formal PT  She has taken naproxen, tylenol with limited relief of the pain as well. She has never tried epidural steroid injections in the past. She denies any bowel or bladder dysfunction/incontinence, saddle anesthesia or gait instability.    I have personally reviewed and/or updated the patient's past medical history, past surgical history, family history, social history, current medications, allergies, and vital signs today.     Review of Systems   Constitutional:  Positive for activity change.   HENT: Negative.     Eyes: Negative.    Respiratory: Negative.     Cardiovascular: Negative.    Gastrointestinal: Negative.    Endocrine: Negative.    Genitourinary: Negative.    Musculoskeletal:  Positive for arthralgias, back pain, gait problem and myalgias.   Skin: Negative.    Allergic/Immunologic: Negative.    Neurological:  Positive for weakness and numbness.   Hematological: Negative.    Psychiatric/Behavioral: Negative.     All other systems reviewed and are negative.      Patient Active Problem List   Diagnosis    Gait difficulty    Contusion of left knee    Primary osteoarthritis of left knee    Acute pain of left knee    Lower extremity pain, diffuse, left    Chronic low back pain with left-sided sciatica    Lumbar radiculopathy - Left       Past Medical History:   Diagnosis Date    Allergic     Anxiety     Depression     Disease of thyroid gland     GERD (gastroesophageal reflux disease)     High cholesterol        Past Surgical History:   Procedure Laterality Date    ADENOIDECTOMY      APPENDECTOMY      TONSILLECTOMY         Family History   Problem Relation Age of Onset    Dementia Mother     Cancer Father        Social History     Occupational History    Not on file   Tobacco Use    Smoking status: Never     Passive exposure: Never    Smokeless tobacco: Never   Vaping Use     "Vaping status: Never Used   Substance and Sexual Activity    Alcohol use: Never    Drug use: Never    Sexual activity: Not on file       Current Outpatient Medications on File Prior to Visit   Medication Sig    atorvastatin (LIPITOR) 40 mg tablet Take 40 mg by mouth daily    azelastine (OPTIVAR) 0.05 % ophthalmic solution     Calcium + Vitamin D3 600-10 MG-MCG TABS     cetirizine (ZyrTEC) 10 mg tablet     fluticasone (FLONASE) 50 mcg/act nasal spray     levothyroxine 75 mcg tablet     pantoprazole (PROTONIX) 40 mg tablet Take 40 mg by mouth daily    sertraline (ZOLOFT) 25 mg tablet      No current facility-administered medications on file prior to visit.       Allergies   Allergen Reactions    Aspirin Other (See Comments)     Unsure of reaction      Doxycycline Other (See Comments)     denies    Escitalopram Other (See Comments)     denies    Naproxen Other (See Comments)     denies     Physical Exam    /82 (BP Location: Left arm, Patient Position: Sitting, Cuff Size: Adult)   Pulse 67   Temp 98.2 °F (36.8 °C)   Resp 18   Ht 5' 4\" (1.626 m)   Wt 75.8 kg (167 lb)   SpO2 97%   BMI 28.67 kg/m²     Constitutional: normal, well developed, well nourished, alert, in no distress and non-toxic and no overt pain behavior. and obese  Eyes: anicteric  HEENT: grossly intact  Neck: supple, symmetric, trachea midline and no masses   Pulmonary:even and unlabored  Cardiovascular:No edema or pitting edema present  Skin:Normal without rashes or lesions and well hydrated  Psychiatric:Mood and affect appropriate  Neurologic:Cranial Nerves II-XII grossly intact Sensation grossly intact; no clonus negative montes's. Reflexes 2+ and brisk. SLR positive left sided  Musculoskeletal:normal gait. 5/5 strength bilaterally with AROM in lower extremities. Difficulty with normal heel toe and tip toe walking. Significant pain with lumbar facet loading bilaterally and with lateral spine rotation, ttp over lumbar paraspinal muscles, " left greater than right. Negative jasbir's test, negative gaenslen's negative SIJ loading bilaterally.    Imaging    No pertinent imaging available to review at this time.

## 2024-04-25 ENCOUNTER — TELEPHONE (OUTPATIENT)
Dept: PAIN MEDICINE | Facility: CLINIC | Age: 59
End: 2024-04-25

## 2024-04-25 NOTE — TELEPHONE ENCOUNTER
Pt appreciative of call and is unable to get to PT does not drive and was given home exercise program and will follow up in the office in 6 weeks for re evaluation and order for MRI

## 2024-04-25 NOTE — TELEPHONE ENCOUNTER
----- Message from Vinod Guevara MD sent at 4/25/2024  7:26 AM EDT -----    There is mild facet hypertrophy across the lower lumbar spine. Otherwise no significant lumbar degenerative change seen on xray. Should proceed with PT and f/u in 6 weeks to obtain MRI that will guide interventional therapy; please relay findings to patient, thanks  ----- Message -----  From: Interface, Radiology Results In  Sent: 4/24/2024   3:58 PM EDT  To: Vinod Guevara MD

## 2024-05-18 ENCOUNTER — HOSPITAL ENCOUNTER (EMERGENCY)
Facility: HOSPITAL | Age: 59
Discharge: HOME | End: 2024-05-18
Attending: EMERGENCY MEDICINE
Payer: COMMERCIAL

## 2024-05-18 ENCOUNTER — APPOINTMENT (EMERGENCY)
Dept: CT IMAGING | Facility: HOSPITAL | Age: 59
End: 2024-05-18
Payer: COMMERCIAL

## 2024-05-18 VITALS
TEMPERATURE: 99.4 F | DIASTOLIC BLOOD PRESSURE: 95 MMHG | HEART RATE: 121 BPM | BODY MASS INDEX: 28.23 KG/M2 | HEIGHT: 64 IN | RESPIRATION RATE: 22 BRPM | OXYGEN SATURATION: 99 % | WEIGHT: 165.34 LBS | SYSTOLIC BLOOD PRESSURE: 151 MMHG

## 2024-05-18 DIAGNOSIS — K57.92 DIVERTICULITIS: Primary | ICD-10-CM

## 2024-05-18 LAB
ALBUMIN SERPL BCP-MCNC: 4.6 G/DL (ref 3.5–5)
ALP SERPL-CCNC: 139 U/L (ref 34–104)
ALT SERPL W P-5'-P-CCNC: 23 U/L (ref 7–52)
ANION GAP SERPL CALCULATED.3IONS-SCNC: 10 MMOL/L (ref 4–13)
AST SERPL W P-5'-P-CCNC: 18 U/L (ref 13–39)
BACTERIA UR QL AUTO: NORMAL /HPF
BASOPHILS # BLD AUTO: 0.03 THOUSANDS/ÂΜL (ref 0–0.1)
BASOPHILS NFR BLD AUTO: 0 % (ref 0–1)
BILIRUB SERPL-MCNC: 0.52 MG/DL (ref 0.2–1)
BILIRUB UR QL STRIP: NEGATIVE
BUN SERPL-MCNC: 17 MG/DL (ref 5–25)
CALCIUM SERPL-MCNC: 9.6 MG/DL (ref 8.4–10.2)
CHLORIDE SERPL-SCNC: 104 MMOL/L (ref 96–108)
CLARITY UR: CLEAR
CO2 SERPL-SCNC: 25 MMOL/L (ref 21–32)
COLOR UR: YELLOW
CREAT SERPL-MCNC: 1.03 MG/DL (ref 0.6–1.3)
EOSINOPHIL # BLD AUTO: 0 THOUSAND/ÂΜL (ref 0–0.61)
EOSINOPHIL NFR BLD AUTO: 0 % (ref 0–6)
ERYTHROCYTE [DISTWIDTH] IN BLOOD BY AUTOMATED COUNT: 12.1 % (ref 11.6–15.1)
GFR SERPL CREATININE-BSD FRML MDRD: 59 ML/MIN/1.73SQ M
GLUCOSE SERPL-MCNC: 202 MG/DL (ref 65–140)
GLUCOSE UR STRIP-MCNC: NEGATIVE MG/DL
HCT VFR BLD AUTO: 43.1 % (ref 34.8–46.1)
HGB BLD-MCNC: 13.9 G/DL (ref 11.5–15.4)
HGB UR QL STRIP.AUTO: NEGATIVE
IMM GRANULOCYTES # BLD AUTO: 0.02 THOUSAND/UL (ref 0–0.2)
IMM GRANULOCYTES NFR BLD AUTO: 0 % (ref 0–2)
KETONES UR STRIP-MCNC: NEGATIVE MG/DL
LEUKOCYTE ESTERASE UR QL STRIP: ABNORMAL
LIPASE SERPL-CCNC: 54 U/L (ref 11–82)
LYMPHOCYTES # BLD AUTO: 2.41 THOUSANDS/ÂΜL (ref 0.6–4.47)
LYMPHOCYTES NFR BLD AUTO: 22 % (ref 14–44)
MCH RBC QN AUTO: 27.1 PG (ref 26.8–34.3)
MCHC RBC AUTO-ENTMCNC: 32.3 G/DL (ref 31.4–37.4)
MCV RBC AUTO: 84 FL (ref 82–98)
MONOCYTES # BLD AUTO: 0.51 THOUSAND/ÂΜL (ref 0.17–1.22)
MONOCYTES NFR BLD AUTO: 5 % (ref 4–12)
NEUTROPHILS # BLD AUTO: 8.01 THOUSANDS/ÂΜL (ref 1.85–7.62)
NEUTS SEG NFR BLD AUTO: 73 % (ref 43–75)
NITRITE UR QL STRIP: NEGATIVE
NON-SQ EPI CELLS URNS QL MICRO: NORMAL /HPF
NRBC BLD AUTO-RTO: 0 /100 WBCS
PH UR STRIP.AUTO: 6 [PH]
PLATELET # BLD AUTO: 242 THOUSANDS/UL (ref 149–390)
PMV BLD AUTO: 9.4 FL (ref 8.9–12.7)
POTASSIUM SERPL-SCNC: 3.4 MMOL/L (ref 3.5–5.3)
PROT SERPL-MCNC: 7.7 G/DL (ref 6.4–8.4)
PROT UR STRIP-MCNC: NEGATIVE MG/DL
RBC # BLD AUTO: 5.12 MILLION/UL (ref 3.81–5.12)
RBC #/AREA URNS AUTO: NORMAL /HPF
SODIUM SERPL-SCNC: 139 MMOL/L (ref 135–147)
SP GR UR STRIP.AUTO: 1.01 (ref 1–1.03)
UROBILINOGEN UR QL STRIP.AUTO: 0.2 E.U./DL
WBC # BLD AUTO: 10.98 THOUSAND/UL (ref 4.31–10.16)
WBC #/AREA URNS AUTO: NORMAL /HPF

## 2024-05-18 PROCEDURE — 93005 ELECTROCARDIOGRAM TRACING: CPT

## 2024-05-18 PROCEDURE — 85025 COMPLETE CBC W/AUTO DIFF WBC: CPT | Performed by: EMERGENCY MEDICINE

## 2024-05-18 PROCEDURE — 80053 COMPREHEN METABOLIC PANEL: CPT | Performed by: EMERGENCY MEDICINE

## 2024-05-18 PROCEDURE — 99284 EMERGENCY DEPT VISIT MOD MDM: CPT

## 2024-05-18 PROCEDURE — 36415 COLL VENOUS BLD VENIPUNCTURE: CPT | Performed by: EMERGENCY MEDICINE

## 2024-05-18 PROCEDURE — 96375 TX/PRO/DX INJ NEW DRUG ADDON: CPT

## 2024-05-18 PROCEDURE — 96374 THER/PROPH/DIAG INJ IV PUSH: CPT

## 2024-05-18 PROCEDURE — 81001 URINALYSIS AUTO W/SCOPE: CPT | Performed by: EMERGENCY MEDICINE

## 2024-05-18 PROCEDURE — 74177 CT ABD & PELVIS W/CONTRAST: CPT

## 2024-05-18 PROCEDURE — 96361 HYDRATE IV INFUSION ADD-ON: CPT

## 2024-05-18 PROCEDURE — 99285 EMERGENCY DEPT VISIT HI MDM: CPT | Performed by: EMERGENCY MEDICINE

## 2024-05-18 PROCEDURE — 83690 ASSAY OF LIPASE: CPT | Performed by: EMERGENCY MEDICINE

## 2024-05-18 RX ORDER — KETOROLAC TROMETHAMINE 30 MG/ML
30 INJECTION, SOLUTION INTRAMUSCULAR; INTRAVENOUS ONCE
Status: COMPLETED | OUTPATIENT
Start: 2024-05-18 | End: 2024-05-18

## 2024-05-18 RX ORDER — LEVOFLOXACIN 500 MG/1
500 TABLET, FILM COATED ORAL DAILY
Qty: 7 TABLET | Refills: 0 | Status: SHIPPED | OUTPATIENT
Start: 2024-05-18 | End: 2024-05-25

## 2024-05-18 RX ORDER — ONDANSETRON 2 MG/ML
4 INJECTION INTRAMUSCULAR; INTRAVENOUS ONCE
Status: COMPLETED | OUTPATIENT
Start: 2024-05-18 | End: 2024-05-18

## 2024-05-18 RX ORDER — METRONIDAZOLE 500 MG/1
500 TABLET ORAL EVERY 8 HOURS SCHEDULED
Qty: 21 TABLET | Refills: 0 | Status: SHIPPED | OUTPATIENT
Start: 2024-05-18 | End: 2024-05-25

## 2024-05-18 RX ORDER — LEVOFLOXACIN 500 MG/1
500 TABLET, FILM COATED ORAL ONCE
Status: COMPLETED | OUTPATIENT
Start: 2024-05-18 | End: 2024-05-18

## 2024-05-18 RX ORDER — METRONIDAZOLE 500 MG/1
500 TABLET ORAL ONCE
Status: COMPLETED | OUTPATIENT
Start: 2024-05-18 | End: 2024-05-18

## 2024-05-18 RX ADMIN — METRONIDAZOLE 500 MG: 500 TABLET ORAL at 22:17

## 2024-05-18 RX ADMIN — IOHEXOL 100 ML: 350 INJECTION, SOLUTION INTRAVENOUS at 21:18

## 2024-05-18 RX ADMIN — KETOROLAC TROMETHAMINE 30 MG: 30 INJECTION, SOLUTION INTRAMUSCULAR at 20:20

## 2024-05-18 RX ADMIN — LEVOFLOXACIN 500 MG: 500 TABLET, FILM COATED ORAL at 22:17

## 2024-05-18 RX ADMIN — ONDANSETRON 4 MG: 2 INJECTION INTRAMUSCULAR; INTRAVENOUS at 20:21

## 2024-05-18 RX ADMIN — SODIUM CHLORIDE 1000 ML: 0.9 INJECTION, SOLUTION INTRAVENOUS at 20:18

## 2024-05-19 NOTE — ED PROVIDER NOTES
History  Chief Complaint   Patient presents with    Abdominal Cramping     Left lower abdominal cramping starting a couple days ago. Denies n/v states she had diarrhea last week.      Left lower quadrant abdominal pain since yesterday.  Last week patient had diarrhea but this has resolved.  No fevers or nausea or vomiting.  No other complaints.      History provided by:  Patient   used: No    Abdominal Cramping  Pain location:  LLQ  Pain quality: cramping    Pain radiates to:  Does not radiate  Pain severity:  Moderate  Onset quality:  Gradual  Duration:  2 days  Timing:  Constant  Progression:  Unchanged  Chronicity:  New  Relieved by:  Nothing  Worsened by:  Nothing  Ineffective treatments:  None tried  Associated symptoms: diarrhea    Associated symptoms: no chest pain, no chills, no constipation, no cough, no dysuria, no fever, no hematemesis, no hematochezia, no hematuria, no nausea, no shortness of breath, no sore throat and no vomiting        Prior to Admission Medications   Prescriptions Last Dose Informant Patient Reported? Taking?   Calcium + Vitamin D3 600-10 MG-MCG TABS   Yes No   atorvastatin (LIPITOR) 40 mg tablet   Yes No   Sig: Take 40 mg by mouth daily   azelastine (OPTIVAR) 0.05 % ophthalmic solution   Yes No   cetirizine (ZyrTEC) 10 mg tablet   Yes No   fluticasone (FLONASE) 50 mcg/act nasal spray   Yes No   gabapentin (NEURONTIN) 300 mg capsule   No No   Sig: Take 1 capsule (300 mg total) by mouth 3 (three) times a day   levothyroxine 75 mcg tablet   Yes No   pantoprazole (PROTONIX) 40 mg tablet   Yes No   Sig: Take 40 mg by mouth daily   sertraline (ZOLOFT) 25 mg tablet   Yes No      Facility-Administered Medications: None       Past Medical History:   Diagnosis Date    Allergic     Anxiety     Depression     Disease of thyroid gland     GERD (gastroesophageal reflux disease)     High cholesterol        Past Surgical History:   Procedure Laterality Date    ADENOIDECTOMY       APPENDECTOMY      TONSILLECTOMY         Family History   Problem Relation Age of Onset    Dementia Mother     Cancer Father      I have reviewed and agree with the history as documented.    E-Cigarette/Vaping    E-Cigarette Use Never User      E-Cigarette/Vaping Substances    Nicotine No     THC No     CBD No     Flavoring No     Other No     Unknown No      Social History     Tobacco Use    Smoking status: Never     Passive exposure: Never    Smokeless tobacco: Never   Vaping Use    Vaping status: Never Used   Substance Use Topics    Alcohol use: Never    Drug use: Never       Review of Systems   Constitutional:  Negative for chills and fever.   HENT:  Negative for ear pain, hearing loss, sore throat, trouble swallowing and voice change.    Eyes:  Negative for pain and discharge.   Respiratory:  Negative for cough, shortness of breath and wheezing.    Cardiovascular:  Negative for chest pain and palpitations.   Gastrointestinal:  Positive for diarrhea. Negative for abdominal pain, blood in stool, constipation, hematemesis, hematochezia, nausea and vomiting.   Genitourinary:  Negative for dysuria, flank pain, frequency and hematuria.   Musculoskeletal:  Negative for joint swelling, neck pain and neck stiffness.   Skin:  Negative for rash and wound.   Neurological:  Negative for dizziness, seizures, syncope, facial asymmetry and headaches.   Psychiatric/Behavioral:  Negative for hallucinations, self-injury and suicidal ideas.    All other systems reviewed and are negative.      Physical Exam  Physical Exam  Vitals and nursing note reviewed.   Constitutional:       General: She is not in acute distress.     Appearance: She is well-developed.   HENT:      Head: Normocephalic and atraumatic.      Right Ear: External ear normal.      Left Ear: External ear normal.   Eyes:      General: No scleral icterus.        Right eye: No discharge.         Left eye: No discharge.      Extraocular Movements: Extraocular movements  intact.      Conjunctiva/sclera: Conjunctivae normal.   Cardiovascular:      Rate and Rhythm: Normal rate and regular rhythm.      Heart sounds: Normal heart sounds. No murmur heard.  Pulmonary:      Effort: Pulmonary effort is normal.      Breath sounds: Normal breath sounds. No wheezing or rales.   Abdominal:      General: Bowel sounds are normal. There is no distension.      Palpations: Abdomen is soft.      Tenderness: There is abdominal tenderness in the left lower quadrant. There is no guarding or rebound. Negative signs include Cox's sign and McBurney's sign.   Musculoskeletal:         General: No deformity. Normal range of motion.      Cervical back: Normal range of motion and neck supple.   Skin:     General: Skin is warm and dry.      Findings: No rash.   Neurological:      General: No focal deficit present.      Mental Status: She is alert and oriented to person, place, and time.      Cranial Nerves: No cranial nerve deficit.   Psychiatric:         Mood and Affect: Mood normal.         Behavior: Behavior normal.         Thought Content: Thought content normal.         Judgment: Judgment normal.         Vital Signs  ED Triage Vitals [05/18/24 2007]   Temperature Pulse Respirations Blood Pressure SpO2   98.6 °F (37 °C) (!) 129 20 (!) 163/107 99 %      Temp Source Heart Rate Source Patient Position - Orthostatic VS BP Location FiO2 (%)   Temporal Monitor Lying Right arm --      Pain Score       10 - Worst Possible Pain           Vitals:    05/18/24 2007 05/18/24 2015   BP: (!) 163/107 151/95   Pulse: (!) 129 (!) 121   Patient Position - Orthostatic VS: Lying          Visual Acuity      ED Medications  Medications   levofloxacin (LEVAQUIN) tablet 500 mg (has no administration in time range)   metroNIDAZOLE (FLAGYL) tablet 500 mg (has no administration in time range)   ketorolac (TORADOL) injection 30 mg (30 mg Intravenous Given 5/18/24 2020)   ondansetron (ZOFRAN) injection 4 mg (4 mg Intravenous Given  5/18/24 2021)   sodium chloride 0.9 % bolus 1,000 mL (0 mL Intravenous Stopped 5/18/24 2137)   iohexol (OMNIPAQUE) 350 MG/ML injection (SINGLE-DOSE) 100 mL (100 mL Intravenous Given 5/18/24 2118)       Diagnostic Studies  Results Reviewed       Procedure Component Value Units Date/Time    Urine Microscopic [[id number]]  (Normal) Collected: 05/18/24 2102    Lab Status: Final result Specimen: Urine, Clean Catch Updated: 05/18/24 2118     RBC, UA None Seen /hpf      WBC, UA 0-1 /hpf      Epithelial Cells Occasional /hpf      Bacteria, UA None Seen /hpf     UA w Reflex to Microscopic w Reflex to Culture [[id number]]  (Abnormal) Collected: 05/18/24 2102    Lab Status: Final result Specimen: Urine, Clean Catch Updated: 05/18/24 2109     Color, UA Yellow     Clarity, UA Clear     Specific Gravity, UA 1.010     pH, UA 6.0     Leukocytes, UA Trace     Nitrite, UA Negative     Protein, UA Negative mg/dl      Glucose, UA Negative mg/dl      Ketones, UA Negative mg/dl      Urobilinogen, UA 0.2 E.U./dl      Bilirubin, UA Negative     Occult Blood, UA Negative    Comprehensive metabolic panel [[id number]]  (Abnormal) Collected: 05/18/24 2019    Lab Status: Final result Specimen: Blood from Arm, Left Updated: 05/18/24 2046     Sodium 139 mmol/L      Potassium 3.4 mmol/L      Chloride 104 mmol/L      CO2 25 mmol/L      ANION GAP 10 mmol/L      BUN 17 mg/dL      Creatinine 1.03 mg/dL      Glucose 202 mg/dL      Calcium 9.6 mg/dL      AST 18 U/L      ALT 23 U/L      Alkaline Phosphatase 139 U/L      Total Protein 7.7 g/dL      Albumin 4.6 g/dL      Total Bilirubin 0.52 mg/dL      eGFR 59 ml/min/1.73sq m     Narrative:      National Kid[employer] guidelines for Chronic Kidney Disease (CKD):     Stage 1 with normal or high GFR (GFR > 90 mL/min/1.73 square meters)    Stage 2 Mild CKD (GFR = 60-89 mL/min/1.73 square meters)    Stage 3A Moderate CKD (GFR = 45-59 mL/min/1.73 square meters)    Stage 3B Moderate CKD (GFR = 30-44  mL/min/1.73 square meters)    Stage 4 Severe CKD (GFR = 15-29 mL/min/1.73 square meters)    Stage 5 End Stage CKD (GFR <15 mL/min/1.73 square meters)  Note: GFR calculation is accurate only with a steady state creatinine    Lipase [[id number]]  (Normal) Collected: 05/18/24 2019    Lab Status: Final result Specimen: Blood from Arm, Left Updated: 05/18/24 2046     Lipase 54 u/L     CBC and differential [[id number]]  (Abnormal) Collected: 05/18/24 2019    Lab Status: Final result Specimen: Blood from Arm, Left Updated: 05/18/24 2026     WBC 10.98 Thousand/uL      RBC 5.12 Million/uL      Hemoglobin 13.9 g/dL      Hematocrit 43.1 %      MCV 84 fL      MCH 27.1 pg      MCHC 32.3 g/dL      RDW 12.1 %      MPV 9.4 fL      Platelets 242 Thousands/uL      nRBC 0 /100 WBCs      Segmented % 73 %      Immature Grans % 0 %      Lymphocytes % 22 %      Monocytes % 5 %      Eosinophils Relative 0 %      Basophils Relative 0 %      Absolute Neutrophils 8.01 Thousands/µL      Absolute Immature Grans 0.02 Thousand/uL      Absolute Lymphocytes 2.41 Thousands/µL      Absolute Monocytes 0.51 Thousand/µL      Eosinophils Absolute 0.00 Thousand/µL      Basophils Absolute 0.03 Thousands/µL                    CT abdomen pelvis w contrast   Final Result by Luis Antonio Fierro MD (05/18 2201)      Uncomplicated sigmoid diverticulitis         Workstation performed: [id number]                    Procedures  ECG 12 Lead Documentation Only    Date/Time: 5/18/2024 8:10 PM    Performed by: Anirudh Arce MD  Authorized by: Anirudh Arce MD    ECG reviewed by me, the ED Provider: yes    Patient location:  ED  Previous ECG:     Previous ECG:  Unavailable  Interpretation:     Interpretation: non-specific    Rate:     ECG rate:  122    ECG rate assessment: tachycardic    Rhythm:     Rhythm: sinus tachycardia    Ectopy:     Ectopy: none    QRS:     QRS axis:  Normal    QRS intervals:  Normal  Conduction:     Conduction: abnormal      Abnormal conduction:  complete RBBB    ST segments:     ST segments:  Normal  T waves:     T waves: normal             ED Course                               SBIRT 22yo+      Flowsheet Row Most Recent Value   Initial Alcohol Screen: US AUDIT-C     1. How often do you have a drink containing alcohol? 0 Filed at: 05/18/2024 2008   2. How many drinks containing alcohol do you have on a typical day you are drinking?  0 Filed at: 05/18/2024 2008   3b. FEMALE Any Age, or MALE 65+: How often do you have 4 or more drinks on one occassion? 0 Filed at: 05/18/2024 2008   Audit-C Score 0 Filed at: 05/18/2024 2008   LETICIA: How many times in the past year have you...    Used an illegal drug or used a prescription medication for non-medical reasons? Never Filed at: 05/18/2024 2008                      Medical Decision Making  Based on the history and medical screening exam performed the diagnostic considerations include but are not limited to colitis, diverticulitis, ulcerative colitis, Crohn's disease, gastroenteritis, muscular abdominal pain.    Based on the work-up performed in the emergency room which includes physical examination, and which may include laboratory studies and imaging as warranted including advanced imaging such as CT scan or ultrasound, the diagnostic considerations are narrowed to exclude limb or life-threatening process.    The patient is stable for discharge.  Lab work benign.  CT with uncomplicated diverticulitis.  Patient provided with oral antibiotic therapy in ED and additional antibiotics prescribed to patient's pharmacy.  Appropriate for discharge    Amount and/or Complexity of Data Reviewed  Labs: ordered. Decision-making details documented in ED Course.     Details: Benign  Radiology: ordered and independent interpretation performed. Decision-making details documented in ED Course.     Details: CT abdomen pelvis consistent with uncomplicated diverticulitis  ECG/medicine tests: ordered and independent interpretation  performed. Decision-making details documented in ED Course.     Details: Sinus tachycardia rate 122 otherwise normal with right bundle    Risk  Prescription drug management.             Disposition  Final diagnoses:   Diverticulitis     Time reflects when diagnosis was documented in both MDM as applicable and the Disposition within this note       Time User Action Codes Description Comment    5/18/2024 10:06 PM Anirudh Arce Add [K57.92] Diverticulitis           ED Disposition       ED Disposition   Discharge    Condition   Stable    Date/Time   Sat May 18, 2024 2206    Comment   Fariba Rothman discharge to home/self care.                   Follow-up Information       Follow up With Specialties Details Why Contact Info    Your primary care physician   As needed             Patient's Medications   Discharge Prescriptions    LEVOFLOXACIN (LEVAQUIN) 500 MG TABLET    Take 1 tablet (500 mg total) by mouth daily for 7 days       Start Date: 5/18/2024 End Date: 5/25/2024       Order Dose: 500 mg       Quantity: 7 tablet    Refills: 0    METRONIDAZOLE (FLAGYL) 500 MG TABLET    Take 1 tablet (500 mg total) by mouth every 8 (eight) hours for 7 days       Start Date: 5/18/2024 End Date: 5/25/2024       Order Dose: 500 mg       Quantity: 21 tablet    Refills: 0       No discharge procedures on file.    PDMP Review       None            ED Provider  Electronically Signed by             Anirudh Arce MD  05/18/24 6400

## 2024-05-19 NOTE — ED NOTES
Pt rang call bell to be placed on bed pan. Placed pt on bed pan. Pt also asked that temperature be decreased. Turned down heat before Pt voided. Once she finished, helped her off bed pan and get comfortable again in bed. No further requests. Call bell within reach.      Lisa Henderson  05/18/24 1539

## 2024-05-29 LAB
ATRIAL RATE: 122 BPM
P AXIS: 72 DEGREES
PR INTERVAL: 182 MS
QRS AXIS: 109 DEGREES
QRSD INTERVAL: 116 MS
QT INTERVAL: 324 MS
QTC INTERVAL: 461 MS
T WAVE AXIS: 51 DEGREES
VENTRICULAR RATE: 122 BPM

## 2024-07-23 ENCOUNTER — RX ONLY (RX ONLY)
Age: 59
End: 2024-07-23

## 2024-07-23 ENCOUNTER — DOCTOR'S OFFICE (OUTPATIENT)
Dept: URBAN - NONMETROPOLITAN AREA CLINIC 1 | Facility: CLINIC | Age: 59
Setting detail: OPHTHALMOLOGY
End: 2024-07-23
Payer: COMMERCIAL

## 2024-07-23 DIAGNOSIS — H10.13: ICD-10-CM

## 2024-07-23 DIAGNOSIS — H25.13: ICD-10-CM

## 2024-07-23 DIAGNOSIS — H40.013: ICD-10-CM

## 2024-07-23 DIAGNOSIS — H04.123: ICD-10-CM

## 2024-07-23 PROCEDURE — 92083 EXTENDED VISUAL FIELD XM: CPT | Performed by: OPHTHALMOLOGY

## 2024-07-23 PROCEDURE — 76514 ECHO EXAM OF EYE THICKNESS: CPT | Performed by: OPHTHALMOLOGY

## 2024-07-23 PROCEDURE — 92020 GONIOSCOPY: CPT | Performed by: OPHTHALMOLOGY

## 2024-07-23 PROCEDURE — 99213 OFFICE O/P EST LOW 20 MIN: CPT | Performed by: OPHTHALMOLOGY

## 2024-07-23 ASSESSMENT — LID EXAM ASSESSMENTS
OS_BLEPHARITIS: T 1+
OD_BLEPHARITIS: T 1+

## 2024-07-23 ASSESSMENT — CONFRONTATIONAL VISUAL FIELD TEST (CVF)
OS_FINDINGS: FULL
OD_FINDINGS: FULL

## 2025-01-24 ENCOUNTER — DOCTOR'S OFFICE (OUTPATIENT)
Dept: URBAN - NONMETROPOLITAN AREA CLINIC 1 | Facility: CLINIC | Age: 60
Setting detail: OPHTHALMOLOGY
End: 2025-01-24
Payer: COMMERCIAL

## 2025-01-24 DIAGNOSIS — H40.1131: ICD-10-CM

## 2025-01-24 DIAGNOSIS — H04.123: ICD-10-CM

## 2025-01-24 DIAGNOSIS — H25.13: ICD-10-CM

## 2025-01-24 DIAGNOSIS — H43.813: ICD-10-CM

## 2025-01-24 DIAGNOSIS — H35.373: ICD-10-CM

## 2025-01-24 DIAGNOSIS — H10.13: ICD-10-CM

## 2025-01-24 PROCEDURE — 99214 OFFICE O/P EST MOD 30 MIN: CPT | Performed by: OPHTHALMOLOGY

## 2025-01-24 PROCEDURE — 92133 CPTRZD OPH DX IMG PST SGM ON: CPT | Performed by: OPHTHALMOLOGY

## 2025-01-24 ASSESSMENT — KERATOMETRY
OS_AXISANGLE_DEGREES: 088
OD_K2POWER_DIOPTERS: 46.50
OS_K2POWER_DIOPTERS: 46.25
OS_K1POWER_DIOPTERS: 45.50
OD_AXISANGLE_DEGREES: 059
OD_K1POWER_DIOPTERS: 45.50

## 2025-01-24 ASSESSMENT — TONOMETRY
OS_IOP_MMHG: 13
OD_IOP_MMHG: 14

## 2025-01-24 ASSESSMENT — PACHYMETRY
OD_CT_CORRECTION: 4
OD_CT_UM: 494
OS_CT_UM: 492
OS_CT_CORRECTION: 4

## 2025-01-24 ASSESSMENT — REFRACTION_CURRENTRX
OD_CYLINDER: -0.75
OS_OVR_VA: 20/
OS_ADD: +2.50
OS_CYLINDER: -0.25
OS_AXIS: 117
OD_AXIS: 95
OD_SPHERE: -3.00
OS_SPHERE: -2.50
OD_ADD: +2.50
OD_VPRISM_DIRECTION: BF
OS_VPRISM_DIRECTION: BF
OD_OVR_VA: 20/

## 2025-01-24 ASSESSMENT — REFRACTION_MANIFEST
OS_VA2: 20/25-2
OS_AXIS: 125
OD_VA2: 20/25-2
OD_SPHERE: -3.00
OD_AXIS: 095
OD_CYLINDER: -0.75
OD_VA1: 20/30+2
OS_ADD: +2.50
OD_ADD: +2.50
OS_SPHERE: -2.50
OS_VA1: 20/25-2
OS_CYLINDER: -0.50

## 2025-01-24 ASSESSMENT — REFRACTION_AUTOREFRACTION
OD_SPHERE: -2.50
OS_AXIS: 092
OD_CYLINDER: -1.50
OS_SPHERE: -2.00
OD_AXIS: 114
OS_CYLINDER: -0.75

## 2025-01-24 ASSESSMENT — LID EXAM ASSESSMENTS
OD_BLEPHARITIS: T 1+
OS_BLEPHARITIS: T 1+

## 2025-01-24 ASSESSMENT — PUNCTA - ASSESSMENT
OS_PUNCTA: SIL PLUG LLL LARGE
OD_PUNCTA: SIL PLUG RLL LARGE

## 2025-01-24 ASSESSMENT — CONFRONTATIONAL VISUAL FIELD TEST (CVF)
OS_FINDINGS: FULL
OD_FINDINGS: FULL

## 2025-01-24 ASSESSMENT — VISUAL ACUITY
OS_BCVA: 20/50-1
OD_BCVA: 20/40-1

## 2025-02-04 ENCOUNTER — DOCTOR'S OFFICE (OUTPATIENT)
Dept: URBAN - NONMETROPOLITAN AREA CLINIC 1 | Facility: CLINIC | Age: 60
Setting detail: OPHTHALMOLOGY
End: 2025-02-04
Payer: COMMERCIAL

## 2025-02-04 DIAGNOSIS — H52.4: ICD-10-CM

## 2025-02-04 DIAGNOSIS — H52.13: ICD-10-CM

## 2025-02-04 PROCEDURE — 92015 DETERMINE REFRACTIVE STATE: CPT | Performed by: OPTOMETRIST

## 2025-02-04 PROCEDURE — 92012 INTRM OPH EXAM EST PATIENT: CPT | Performed by: OPTOMETRIST

## 2025-02-04 ASSESSMENT — REFRACTION_CURRENTRX
OD_OVR_VA: 20/
OS_ADD: +2.50
OS_SPHERE: -2.50
OS_CYLINDER: -0.25
OD_ADD: +2.50
OD_AXIS: 95
OD_VPRISM_DIRECTION: BF
OS_OVR_VA: 20/
OD_SPHERE: -3.00
OD_CYLINDER: -0.75
OS_AXIS: 117
OS_VPRISM_DIRECTION: BF

## 2025-02-04 ASSESSMENT — REFRACTION_AUTOREFRACTION
OS_AXIS: 089
OD_SPHERE: -2.25
OD_CYLINDER: -1.75
OD_AXIS: 116
OS_SPHERE: -1.50
OS_CYLINDER: -1.25

## 2025-02-04 ASSESSMENT — CONFRONTATIONAL VISUAL FIELD TEST (CVF)
OD_FINDINGS: FULL
OS_FINDINGS: FULL

## 2025-02-04 ASSESSMENT — PUNCTA - ASSESSMENT
OD_PUNCTA: SIL PLUG RLL LARGE
OS_PUNCTA: SIL PLUG LLL LARGE

## 2025-02-04 ASSESSMENT — KERATOMETRY
OS_K2POWER_DIOPTERS: 46.25
OS_K1POWER_DIOPTERS: 45.25
OD_K2POWER_DIOPTERS: 46.75
OD_K1POWER_DIOPTERS: 46.00
OS_AXISANGLE_DEGREES: 097
OD_AXISANGLE_DEGREES: 069

## 2025-02-04 ASSESSMENT — REFRACTION_MANIFEST
OD_SPHERE: -2.50
OS_AXIS: 090
OD_CYLINDER: -1.50
OD_AXIS: 105
OS_VA2: 20/25-2
OD_VA1: 20/30+2
OS_VA1: 20/25-2
OS_ADD: +2.50
OS_SPHERE: -2.25
OD_ADD: +2.50
OD_VA2: 20/25-2
OS_CYLINDER: -1.00

## 2025-02-04 ASSESSMENT — LID EXAM ASSESSMENTS
OS_BLEPHARITIS: T 1+
OD_BLEPHARITIS: T 1+

## 2025-02-04 ASSESSMENT — VISUAL ACUITY
OD_BCVA: 20/40-1
OS_BCVA: 20/50-1

## 2025-06-02 ENCOUNTER — OFFICE VISIT (OUTPATIENT)
Dept: URGENT CARE | Facility: CLINIC | Age: 60
End: 2025-06-02
Payer: COMMERCIAL

## 2025-06-02 ENCOUNTER — APPOINTMENT (OUTPATIENT)
Dept: RADIOLOGY | Facility: CLINIC | Age: 60
End: 2025-06-02
Attending: EMERGENCY MEDICINE
Payer: COMMERCIAL

## 2025-06-02 VITALS
TEMPERATURE: 97.8 F | OXYGEN SATURATION: 98 % | RESPIRATION RATE: 18 BRPM | HEIGHT: 64 IN | SYSTOLIC BLOOD PRESSURE: 134 MMHG | HEART RATE: 77 BPM | WEIGHT: 164 LBS | DIASTOLIC BLOOD PRESSURE: 72 MMHG | BODY MASS INDEX: 28 KG/M2
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
## 2025-06-02 DIAGNOSIS — M79.604 RIGHT LEG PAIN: ICD-10-CM

## 2025-06-02 DIAGNOSIS — M25.561 ACUTE PAIN OF RIGHT KNEE: Primary | ICD-10-CM

## 2025-06-02 PROCEDURE — 73564 X-RAY EXAM KNEE 4 OR MORE: CPT

## 2025-06-02 PROCEDURE — S9088 SERVICES PROVIDED IN URGENT: HCPCS | Performed by: EMERGENCY MEDICINE

## 2025-06-02 PROCEDURE — 73590 X-RAY EXAM OF LOWER LEG: CPT

## 2025-06-02 PROCEDURE — 99214 OFFICE O/P EST MOD 30 MIN: CPT | Performed by: EMERGENCY MEDICINE

## 2025-06-02 RX ORDER — ACETAMINOPHEN 500 MG
1000 TABLET ORAL EVERY 6 HOURS PRN
Qty: 120 TABLET | Refills: 1 | Status: SHIPPED | OUTPATIENT
Start: 2025-06-02 | End: 2025-07-02

## 2025-06-02 RX ORDER — CHLORAL HYDRATE 500 MG
CAPSULE ORAL
COMMUNITY
Start: 2025-05-20

## 2025-06-02 NOTE — PROGRESS NOTES
"  [facility]        NAME: Fariba Rothman is a 59 y.o. female  : 1965    MRN: [id number]  DATE: 2025  TIME: 6:28 PM    Assessment and Plan   Acute pain of right knee [M25.561]  1. Acute pain of right knee  acetaminophen (TYLENOL) 500 mg tablet      2. Fall, initial encounter  XR tibia fibula 2 vw right    XR knee 4+ vw right injury    Ambulatory Referral to Physical Therapy      3. Right leg pain  acetaminophen (TYLENOL) 500 mg tablet        No acute fracture or dislocation noted on imaging.  Patient able to ambulate in clinic without difficulty.  Will discharge with referral to physical therapy.  Patient advised to seek care in ED if symptoms worsen.    Patient Instructions       Follow up with PCP in 3-5 days.  Proceed to  ER if symptoms worsen.    If tests have been performed at [facility], our office will contact you with results if changes need to be made to the care plan discussed with you at the visit.  You can review your full results on [facility] MyChart.    Chief Complaint     Chief Complaint   Patient presents with    Fall     Pt states she fell x3 days ago down a step and hurt her right leg. Pt states she has been falling frequently and this is new for her.          History of Present Illness       59-year-old female with history of anxiety, depression, GERD presents for chief complaint of right lower leg pain after falling forward off of 1 step 3 days ago.  Patient states that she \"caught herself\" on the side of the wall with outstretched hands and did not strike her head, fall to the ground, or lose consciousness.  Denies neck or back pain, or additional injury.  States that she has felt a burning sensation around her right proximal fibula and lower leg since the injury which has been waxing and waning.  States that she is otherwise able to ambulate and bear weight on the affected extremity, albeit with some pain.  She also endorses some acute on chronic right knee pain since " the injury, and denies focal numbness or weakness in the affected extremity.  Denies antecedent syncope, lightheadedness, chest pain, shortness of breath, or dizziness prior to the fall.    Fall  Pertinent negatives include no abdominal pain, fever, headaches, hematuria, nausea, numbness or vomiting.       Review of Systems   Review of Systems   Constitutional:  Negative for activity change, appetite change, chills, diaphoresis, fatigue, fever and unexpected weight change.   HENT:  Negative for ear pain and sore throat.    Eyes:  Negative for photophobia, pain, discharge, redness, itching and visual disturbance.   Respiratory:  Negative for cough and shortness of breath.    Cardiovascular:  Negative for chest pain and palpitations.   Gastrointestinal:  Negative for abdominal distention, abdominal pain, anal bleeding, blood in stool, constipation, diarrhea, nausea, rectal pain and vomiting.   Genitourinary:  Negative for dysuria and hematuria.   Musculoskeletal:  Positive for arthralgias, gait problem and myalgias. Negative for back pain, joint swelling, neck pain and neck stiffness.   Skin:  Negative for color change, pallor, rash and wound.   Neurological:  Negative for dizziness, tremors, seizures, syncope, facial asymmetry, speech difficulty, weakness, light-headedness, numbness and headaches.   All other systems reviewed and are negative.        Current Medications     Current Medications[1]    Current Allergies     Allergies as of 06/02/2025 - Reviewed 06/02/2025   Allergen Reaction Noted    Aspirin Other (See Comments) 06/26/2019    Doxycycline Other (See Comments) 06/04/2010    Escitalopram Other (See Comments) 06/04/2010    Naproxen Other (See Comments) 06/04/2010            The following portions of the patient's history were reviewed and updated as appropriate: allergies, current medications, past family history, past medical history, past social history, past surgical history and problem list.     Past  "Medical History[2]    Past Surgical History[3]    Family History[4]      Medications have been verified.        Objective   /72   Pulse 77   Temp 97.8 °F (36.6 °C)   Resp 18   Ht 5' 4\" (1.626 m)   Wt 74.4 kg (164 lb)   SpO2 98%   BMI 28.15 kg/m²   No LMP recorded. Patient is postmenopausal.       Physical Exam     Physical Exam  Vitals and nursing note reviewed.   Constitutional:       General: She is not in acute distress.     Appearance: Normal appearance. She is normal weight. She is not ill-appearing, toxic-appearing or diaphoretic.   HENT:      Head: Normocephalic and atraumatic.      Right Ear: Tympanic membrane, ear canal and external ear normal. There is no impacted cerumen.      Left Ear: Tympanic membrane, ear canal and external ear normal. There is no impacted cerumen.      Nose: Nose normal. No congestion or rhinorrhea.      Mouth/Throat:      Mouth: Mucous membranes are moist.      Pharynx: No oropharyngeal exudate or posterior oropharyngeal erythema.     Eyes:      General: No visual field deficit or scleral icterus.        Right eye: No discharge.         Left eye: No discharge.      Extraocular Movements: Extraocular movements intact.      Pupils: Pupils are equal, round, and reactive to light.       Cardiovascular:      Rate and Rhythm: Normal rate and regular rhythm.      Pulses: Normal pulses.           Carotid pulses are 2+ on the right side and 2+ on the left side.       Radial pulses are 2+ on the right side and 2+ on the left side.      Heart sounds: No murmur heard.     No friction rub. No gallop.   Pulmonary:      Effort: Pulmonary effort is normal. No respiratory distress.      Breath sounds: Normal breath sounds. No stridor. No wheezing, rhonchi or rales.   Chest:      Chest wall: No tenderness.   Abdominal:      General: Abdomen is flat. There is no distension.      Palpations: Abdomen is soft. There is no mass.      Tenderness: There is no abdominal tenderness. There is no " right CVA tenderness, left CVA tenderness, guarding or rebound.      Hernia: No hernia is present.     Musculoskeletal:         General: Tenderness present. No swelling, deformity or signs of injury.      Cervical back: Normal, normal range of motion and neck supple.      Thoracic back: Normal.      Lumbar back: Normal.      Right hip: Normal.      Left hip: Normal.      Right upper leg: Normal.      Left upper leg: Normal.      Right knee: Bony tenderness present. No swelling, deformity, effusion, erythema, ecchymosis, lacerations or crepitus. Decreased range of motion. Tenderness present over the lateral joint line. No medial joint line tenderness. No LCL laxity, MCL laxity, ACL laxity or PCL laxity. Normal alignment, normal meniscus and normal patellar mobility. Normal pulse.      Instability Tests: Anterior drawer test negative. Posterior drawer test negative. Anterior Lachman test negative. Medial Amari test negative and lateral Amari test negative.      Left knee: Normal.      Right lower leg: Tenderness and bony tenderness present. No swelling, deformity or lacerations. No edema.      Left lower leg: Normal. No swelling. No edema.      Right ankle: Normal. No swelling, deformity, ecchymosis or lacerations. No tenderness. Normal range of motion. Anterior drawer test negative. Normal pulse.      Right Achilles Tendon: Normal. No tenderness or defects. Collins's test negative.      Left ankle: Normal.      Left Achilles Tendon: Normal.      Right foot: Normal. Normal range of motion and normal capillary refill. No swelling, deformity, bunion, Charcot foot, foot drop, prominent metatarsal heads, laceration, tenderness, bony tenderness or crepitus. Normal pulse.      Left foot: Normal.      Comments: No midline cervical, thoracic, or lumbar spinal tenderness, step-offs, or deformities noted.    Inspection of right knee and lower leg without gross deformity, ecchymosis, swelling, or wounds.  Mild tenderness  to palpation noted over the anterolateral proximal tibia and fibular head, as well as the inferior knee.  Patient otherwise with full active and passive range of motion in knee flexion extension.         Skin:     General: Skin is warm and dry.      Capillary Refill: Capillary refill takes less than 2 seconds.      Coloration: Skin is not jaundiced or pale.      Findings: No bruising, erythema, lesion or rash.     Neurological:      General: No focal deficit present.      Mental Status: She is alert and oriented to person, place, and time.      GCS: GCS eye subscore is 4. GCS verbal subscore is 5.      Cranial Nerves: Cranial nerves 2-12 are intact. No cranial nerve deficit, dysarthria or facial asymmetry.      Sensory: Sensation is intact. No sensory deficit.      Motor: Motor function is intact. No weakness, tremor, atrophy, abnormal muscle tone, seizure activity or pronator drift.      Coordination: Coordination is intact. Coordination normal. Finger-Nose-Finger Test normal.      Gait: Gait is intact. Gait normal.     Psychiatric:         Mood and Affect: Mood normal.         Behavior: Behavior normal.                          [1]   Current Outpatient Medications:     acetaminophen (TYLENOL) 500 mg tablet, Take 2 tablets (1,000 mg total) by mouth every 6 (six) hours as needed for mild pain or moderate pain, Disp: 120 tablet, Rfl: 1    atorvastatin (LIPITOR) 40 mg tablet, Take 40 mg by mouth in the morning., Disp: , Rfl:     Calcium + Vitamin D3 600-10 MG-MCG TABS, , Disp: , Rfl:     cetirizine (ZyrTEC) 10 mg tablet, , Disp: , Rfl:     fluticasone (FLONASE) 50 mcg/act nasal spray, , Disp: , Rfl:     gabapentin (NEURONTIN) 300 mg capsule, Take 1 capsule (300 mg total) by mouth 3 (three) times a day, Disp: 90 capsule, Rfl: 2    levothyroxine 75 mcg tablet, , Disp: , Rfl:     Omega-3 Fatty Acids (fish oil) 1,000 mg, , Disp: , Rfl:     pantoprazole (PROTONIX) 40 mg tablet, Take 40 mg by mouth in the morning., Disp: ,  Rfl:     sertraline (ZOLOFT) 25 mg tablet, , Disp: , Rfl:     azelastine (OPTIVAR) 0.05 % ophthalmic solution, , Disp: , Rfl:   [2]   Past Medical History:  Diagnosis Date    Allergic     Anxiety     Depression     Disease of thyroid gland     GERD (gastroesophageal reflux disease)     High cholesterol    [3]   Past Surgical History:  Procedure Laterality Date    ADENOIDECTOMY      APPENDECTOMY      TONSILLECTOMY     [4]   Family History  Problem Relation Name Age of Onset    Dementia Mother      Cancer Father